# Patient Record
Sex: FEMALE | Race: ASIAN | NOT HISPANIC OR LATINO | URBAN - METROPOLITAN AREA
[De-identification: names, ages, dates, MRNs, and addresses within clinical notes are randomized per-mention and may not be internally consistent; named-entity substitution may affect disease eponyms.]

---

## 2019-09-12 ENCOUNTER — EMERGENCY (EMERGENCY)
Facility: HOSPITAL | Age: 39
LOS: 1 days | Discharge: HOME | End: 2019-09-12
Attending: EMERGENCY MEDICINE
Payer: COMMERCIAL

## 2019-09-12 VITALS
HEIGHT: 64 IN | RESPIRATION RATE: 18 BRPM | DIASTOLIC BLOOD PRESSURE: 90 MMHG | SYSTOLIC BLOOD PRESSURE: 133 MMHG | TEMPERATURE: 103 F | OXYGEN SATURATION: 98 % | WEIGHT: 149.91 LBS | HEART RATE: 108 BPM

## 2019-09-12 VITALS
SYSTOLIC BLOOD PRESSURE: 99 MMHG | HEART RATE: 87 BPM | DIASTOLIC BLOOD PRESSURE: 58 MMHG | TEMPERATURE: 99 F | OXYGEN SATURATION: 100 % | RESPIRATION RATE: 16 BRPM

## 2019-09-12 LAB
ALBUMIN SERPL ELPH-MCNC: 4.4 G/DL — SIGNIFICANT CHANGE UP (ref 3.3–5)
ALP SERPL-CCNC: 162 U/L — HIGH (ref 40–120)
ALT FLD-CCNC: 329 U/L — HIGH (ref 10–45)
ANION GAP SERPL CALC-SCNC: 14 MMOL/L — SIGNIFICANT CHANGE UP (ref 5–17)
APPEARANCE UR: ABNORMAL
AST SERPL-CCNC: 363 U/L — HIGH (ref 10–40)
BASOPHILS # BLD AUTO: 0.1 K/UL — SIGNIFICANT CHANGE UP (ref 0–0.2)
BASOPHILS NFR BLD AUTO: 0.7 % — SIGNIFICANT CHANGE UP (ref 0–2)
BILIRUB SERPL-MCNC: 0.7 MG/DL — SIGNIFICANT CHANGE UP (ref 0.2–1.2)
BILIRUB UR-MCNC: NEGATIVE — SIGNIFICANT CHANGE UP
BUN SERPL-MCNC: 7 MG/DL — SIGNIFICANT CHANGE UP (ref 7–23)
CALCIUM SERPL-MCNC: 9.5 MG/DL — SIGNIFICANT CHANGE UP (ref 8.4–10.5)
CHLORIDE SERPL-SCNC: 96 MMOL/L — SIGNIFICANT CHANGE UP (ref 96–108)
CO2 SERPL-SCNC: 22 MMOL/L — SIGNIFICANT CHANGE UP (ref 22–31)
COLOR SPEC: YELLOW — SIGNIFICANT CHANGE UP
CREAT SERPL-MCNC: 0.68 MG/DL — SIGNIFICANT CHANGE UP (ref 0.5–1.3)
DIFF PNL FLD: ABNORMAL
EOSINOPHIL # BLD AUTO: 0.1 K/UL — SIGNIFICANT CHANGE UP (ref 0–0.5)
EOSINOPHIL NFR BLD AUTO: 0.7 % — SIGNIFICANT CHANGE UP (ref 0–6)
GLUCOSE SERPL-MCNC: 124 MG/DL — HIGH (ref 70–99)
GLUCOSE UR QL: NEGATIVE — SIGNIFICANT CHANGE UP
HCG UR QL: NEGATIVE — SIGNIFICANT CHANGE UP
HCT VFR BLD CALC: 45.5 % — HIGH (ref 34.5–45)
HGB BLD-MCNC: 14.5 G/DL — SIGNIFICANT CHANGE UP (ref 11.5–15.5)
KETONES UR-MCNC: ABNORMAL
LEUKOCYTE ESTERASE UR-ACNC: ABNORMAL
LYMPHOCYTES # BLD AUTO: 1.2 K/UL — SIGNIFICANT CHANGE UP (ref 1–3.3)
LYMPHOCYTES # BLD AUTO: 15.1 % — SIGNIFICANT CHANGE UP (ref 13–44)
MCHC RBC-ENTMCNC: 29.5 PG — SIGNIFICANT CHANGE UP (ref 27–34)
MCHC RBC-ENTMCNC: 31.9 GM/DL — LOW (ref 32–36)
MCV RBC AUTO: 92.5 FL — SIGNIFICANT CHANGE UP (ref 80–100)
MONOCYTES # BLD AUTO: 0.4 K/UL — SIGNIFICANT CHANGE UP (ref 0–0.9)
MONOCYTES NFR BLD AUTO: 5.1 % — SIGNIFICANT CHANGE UP (ref 2–14)
NEUTROPHILS # BLD AUTO: 6.3 K/UL — SIGNIFICANT CHANGE UP (ref 1.8–7.4)
NEUTROPHILS NFR BLD AUTO: 78.4 % — HIGH (ref 43–77)
NITRITE UR-MCNC: NEGATIVE — SIGNIFICANT CHANGE UP
PH UR: 6.5 — SIGNIFICANT CHANGE UP (ref 5–8)
PLATELET # BLD AUTO: 218 K/UL — SIGNIFICANT CHANGE UP (ref 150–400)
POTASSIUM SERPL-MCNC: 3.3 MMOL/L — LOW (ref 3.5–5.3)
POTASSIUM SERPL-SCNC: 3.3 MMOL/L — LOW (ref 3.5–5.3)
PROT SERPL-MCNC: 7.7 G/DL — SIGNIFICANT CHANGE UP (ref 6–8.3)
PROT UR-MCNC: ABNORMAL
RBC # BLD: 4.92 M/UL — SIGNIFICANT CHANGE UP (ref 3.8–5.2)
RBC # FLD: 11.8 % — SIGNIFICANT CHANGE UP (ref 10.3–14.5)
SODIUM SERPL-SCNC: 132 MMOL/L — LOW (ref 135–145)
SP GR SPEC: 1.02 — SIGNIFICANT CHANGE UP (ref 1.01–1.02)
UROBILINOGEN FLD QL: ABNORMAL
WBC # BLD: 8 K/UL — SIGNIFICANT CHANGE UP (ref 3.8–10.5)
WBC # FLD AUTO: 8 K/UL — SIGNIFICANT CHANGE UP (ref 3.8–10.5)

## 2019-09-12 PROCEDURE — 99284 EMERGENCY DEPT VISIT MOD MDM: CPT

## 2019-09-12 PROCEDURE — 71045 X-RAY EXAM CHEST 1 VIEW: CPT | Mod: 26

## 2019-09-12 PROCEDURE — 71045 X-RAY EXAM CHEST 1 VIEW: CPT

## 2019-09-12 PROCEDURE — 76705 ECHO EXAM OF ABDOMEN: CPT | Mod: 26

## 2019-09-12 PROCEDURE — 81001 URINALYSIS AUTO W/SCOPE: CPT

## 2019-09-12 PROCEDURE — 80053 COMPREHEN METABOLIC PANEL: CPT

## 2019-09-12 PROCEDURE — 96361 HYDRATE IV INFUSION ADD-ON: CPT

## 2019-09-12 PROCEDURE — 81025 URINE PREGNANCY TEST: CPT

## 2019-09-12 PROCEDURE — 96374 THER/PROPH/DIAG INJ IV PUSH: CPT

## 2019-09-12 PROCEDURE — 85027 COMPLETE CBC AUTOMATED: CPT

## 2019-09-12 PROCEDURE — 87086 URINE CULTURE/COLONY COUNT: CPT

## 2019-09-12 PROCEDURE — 99284 EMERGENCY DEPT VISIT MOD MDM: CPT | Mod: 25

## 2019-09-12 PROCEDURE — 76705 ECHO EXAM OF ABDOMEN: CPT

## 2019-09-12 RX ORDER — KETOROLAC TROMETHAMINE 30 MG/ML
15 SYRINGE (ML) INJECTION ONCE
Refills: 0 | Status: DISCONTINUED | OUTPATIENT
Start: 2019-09-12 | End: 2019-09-12

## 2019-09-12 RX ORDER — CEPHALEXIN 500 MG
1 CAPSULE ORAL
Qty: 15 | Refills: 0
Start: 2019-09-12 | End: 2019-09-16

## 2019-09-12 RX ORDER — ACETAMINOPHEN 500 MG
650 TABLET ORAL ONCE
Refills: 0 | Status: COMPLETED | OUTPATIENT
Start: 2019-09-12 | End: 2019-09-12

## 2019-09-12 RX ORDER — SODIUM CHLORIDE 9 MG/ML
2000 INJECTION INTRAMUSCULAR; INTRAVENOUS; SUBCUTANEOUS ONCE
Refills: 0 | Status: COMPLETED | OUTPATIENT
Start: 2019-09-12 | End: 2019-09-12

## 2019-09-12 RX ORDER — SODIUM CHLORIDE 9 MG/ML
500 INJECTION INTRAMUSCULAR; INTRAVENOUS; SUBCUTANEOUS ONCE
Refills: 0 | Status: COMPLETED | OUTPATIENT
Start: 2019-09-12 | End: 2019-09-12

## 2019-09-12 RX ADMIN — Medication 15 MILLIGRAM(S): at 02:07

## 2019-09-12 RX ADMIN — SODIUM CHLORIDE 2000 MILLILITER(S): 9 INJECTION INTRAMUSCULAR; INTRAVENOUS; SUBCUTANEOUS at 04:34

## 2019-09-12 RX ADMIN — Medication 650 MILLIGRAM(S): at 01:11

## 2019-09-12 RX ADMIN — SODIUM CHLORIDE 2000 MILLILITER(S): 9 INJECTION INTRAMUSCULAR; INTRAVENOUS; SUBCUTANEOUS at 01:05

## 2019-09-12 RX ADMIN — Medication 650 MILLIGRAM(S): at 02:07

## 2019-09-12 RX ADMIN — Medication 15 MILLIGRAM(S): at 01:12

## 2019-09-12 RX ADMIN — SODIUM CHLORIDE 500 MILLILITER(S): 9 INJECTION INTRAMUSCULAR; INTRAVENOUS; SUBCUTANEOUS at 04:18

## 2019-09-12 NOTE — ED PROVIDER NOTE - PHYSICAL EXAMINATION
Gen: AAOx3, non-toxic  Head: NCAT  HEENT: EOMI, oral mucosa moist, normal conjunctiva  Lung: CTAB, no respiratory distress, no wheezes/rhonchi/rales B/L, speaking in full sentences  CV: RRR, no murmurs, rubs or gallops  Abd: soft, NTND, no guarding, no CVA tenderness  MSK: no visible deformities  Neuro: No focal sensory or motor deficits  Skin: Warm, well perfused, no rash  Psych: normal affect.   ~Crow Kristy PGY2 Gen: AAOx3, non-toxic  Head: NCAT  HEENT: EOMI, oral mucosa moist, normal conjunctiva  Lung: CTAB, no respiratory distress, no wheezes/rhonchi/rales B/L, speaking in full sentences  CV: RRR, no murmurs, rubs or gallops  Abd: soft, NTND, no guarding  MSK: no visible deformities  Neuro: No focal sensory or motor deficits  Skin: Warm, well perfused, no rash  Psych: normal affect.   ~Crow Kristy PGY2

## 2019-09-12 NOTE — ED PROVIDER NOTE - PATIENT PORTAL LINK FT
You can access the FollowMyHealth Patient Portal offered by [facility] by registering at the following website: [email or web address]. By joining [employer]’s FollowMyHealth portal, you will also be able to view your health information using other applications (apps) compatible with our system.

## 2019-09-12 NOTE — ED PROVIDER NOTE - NS ED ROS FT
Gen: +fevers  Eyes: No vision changes, eye irritation or discharge  ENT: +sore throat  Resp: +cough. No SOB  Cardiovascular: No chest pain or palpitations  GI: No abdominal pain, nausea/vomiting, diarrhea, constipation  :  No change in urine output or frequency; no dysuria  MS: + low back pain  Skin: No rashes  Neuro: + headache; No numbness or weakness  Remainder negative, except as per the HPI

## 2019-09-12 NOTE — ED ADULT NURSE NOTE - OBJECTIVE STATEMENT
39 yo f reporting to ED for fever. No PMH. pt reporting fever & productive cough x3 days. Pt was evaluated by PMD whom prescribed Amoxicillin. Pt has been taking tylenol (last dose of 1000mg at 18:00) with minimal relief. Max temperature of 104.2 F at home. Pt AAOx3, NAD, lungs clear bilat, abdomen soft nontender nondistended, strong peripheral pulses x 4, cap refill < 2 seconds, skin warm and dry. Pt denies headache, dizziness, chest pain, palpitations, SOB, abdominal pain, n/v/d, urinary symptoms, weakness at this time. 20 gauge established in the RAC. Pt placed on CM. NSR. EKG completed.  at bedside. bed locked & in lowest position. Safety maintained. Will continue to reassess. 37 yo f reporting to ED for fever. No PMH. pt reporting fever & productive cough x3 days. Pt was evaluated by PMD whom prescribed Amoxicillin. Pt has been taking tylenol (last dose of 1000mg at 18:00) with minimal relief. Max temperature of 104.2 F at home. Pt AAOx3, NAD, lungs clear bilat, abdomen soft nontender nondistended, strong peripheral pulses x 4, cap refill < 2 seconds, skin warm and dry. Pt denies headache, dizziness, chest pain, palpitations, SOB, abdominal pain, vomiting, diarrhea, blood in the urine, blood in the stool, urinary symptoms, weakness at this time. 20 gauge established in the RAC. Pt placed on CM. NSR. EKG completed.  at bedside. bed locked & in lowest position. Safety maintained. Will continue to reassess.

## 2019-09-12 NOTE — ED ADULT NURSE NOTE - NURSING NEURO ORIENTATION
Pt returned call. Spoke to pt, reviewed results and POC per AMS. Pt verbalized understanding. Pt does not want to do PT at this time as her insurance does not cover it.   Suggested stretching exercises and that pt should rest and monitor and let our offi oriented to person, place and time

## 2019-09-12 NOTE — ED PROVIDER NOTE - OBJECTIVE STATEMENT
38y female with no significant PMH presenting with fevers x4 days. Has had persistent fevers for 4 days, respond to tylenol, but return, Tmax was 104 measured by ear today. Has had a headache, sore throat, cough and lower back pain. No photophobia, no neck stiffness. Has had two sick contacts at home. No recent travel, no rashes, states that her vaccines are up to date. No n/v/d, urinary symptoms.

## 2019-09-12 NOTE — ED ADULT NURSE NOTE - CHPI ED NUR SYMPTOMS NEG
no nausea/no pain/no vomiting/no chills/no dizziness/no weakness/no tingling/no decreased eating/drinking

## 2019-09-12 NOTE — ED ADULT NURSE REASSESSMENT NOTE - NS ED NURSE REASSESS COMMENT FT1
Pt AAOx4, NAD, resting comfortably in bed with spouse at bedside. Pt denies headache, dizziness, chest pain, cough, SOB, abdominal pain, n/v/d, urinary symptoms, fevers, chills, weakness at this time. MD Small aware of low blood pressure; pt okay to be discharged. Pt verbalized understanding to follow-up with PMD. Pt verbalized understanding to take antibiotics as prescribed. Pt verbalized understanding to follow-up with PMD regarding LFTs. Pt verbalized understanding to return to ED for revers, n/v, blood in the urine, fevers, weakness, chest pain, & SOB/ Pt discharged as per MD, IV removed as per MD, pt ambulated independently out of ED.

## 2019-09-12 NOTE — ED ADULT NURSE REASSESSMENT NOTE - NS ED NURSE REASSESS COMMENT FT1
03:30. Pt ambulating to restroom at this time. Pt denies chest pain, SOB, palpitations, dizziness, lightheadedness, & weakness. Gait steady.

## 2019-09-12 NOTE — ED PROVIDER NOTE - ATTENDING CONTRIBUTION TO CARE
37 y/o f presenting w/ cc of fever since monday, dry cough, no producive sputum, no sob, no rash, no back pain, no urinary symptoms, no ha, no neck pain, pos sore throat, saw pcp today and got amoxicillin for uknown reasons. has fever, tachy, but normal hr at bs. no clear signs of bacterial infection, pos erythema of throat, ears normal, chest clear, abd soft nt, legs no ttp. not sepsis given no bacterial source. plan for antipyretics, fluids. cxr. 37 y/o f presenting w/ cc of fever since monday, dry cough, no producive sputum, no sob, no rash, no back pain, no urinary symptoms, no ha, no neck pain, pos sore throat, saw pcp today and got amoxicillin for uknown reasons. has fever, tachy, but normal hr at bs. no clear signs of bacterial infection, pos erythema of throat, ears normal, chest clear, abd soft nt, legs no ttp. not sepsis given no bacterial source. plan for antipyretics, fluids. cxr.    No obvious pna on x ray, possible UTI, pt improved significantly with antypyretics and fluids, bp was soft but had maps of 69. ambulated w/ no sob, lightheadedness, dizzyness. given 2500 of fluid, pt wanted to go home after workup. abx changed to keflex for possible uti.

## 2019-09-12 NOTE — ED ADULT NURSE NOTE - NSIMPLEMENTINTERV_GEN_ALL_ED
Implemented All Universal Safety Interventions:  [place] to call system. Call bell, personal items and telephone within reach. Instruct patient to call for assistance. Room bathroom lighting operational. Non-slip footwear when patient is off stretcher. Physically safe environment: no spills, clutter or unnecessary equipment. Stretcher in lowest position, wheels locked, appropriate side rails in place.

## 2019-09-12 NOTE — ED PROVIDER NOTE - CLINICAL SUMMARY MEDICAL DECISION MAKING FREE TEXT BOX
38y female presenting with fever for 4 days. Has had sore throat, headache, low back pain, with sick contacts. Low suspicion for meningitis no neck stiffness, no pain with ROM or inhibited ROM of the neck, no photophobia. More likely viral illness. Will get labs, CXR.

## 2019-09-12 NOTE — ED PROVIDER NOTE - NSFOLLOWUPINSTRUCTIONS_ED_ALL_ED_FT
Please follow up with primary physician within the next week. You need to follow up your liver function tests and need repeat blood work at your next doctors visit.     Do not participate in any contact sports or any strenuous physical activity until cleared by your doctor.    Stop taking the Amoxicillin and start taking the cephalexin that was sent to your pharmacy.    Return to the emergency department if you have new or worsening symptoms, such as: worsening headaches, worsening fevers, worsening body aches, or vomiting.

## 2019-09-13 LAB
CULTURE RESULTS: NO GROWTH — SIGNIFICANT CHANGE UP
SPECIMEN SOURCE: SIGNIFICANT CHANGE UP

## 2019-09-15 ENCOUNTER — INPATIENT (INPATIENT)
Facility: HOSPITAL | Age: 39
LOS: 5 days | Discharge: HOME | DRG: 178 | End: 2019-09-21
Attending: HOSPITALIST | Admitting: HOSPITALIST
Payer: COMMERCIAL

## 2019-09-15 ENCOUNTER — TRANSCRIPTION ENCOUNTER (OUTPATIENT)
Age: 39
End: 2019-09-15

## 2019-09-15 VITALS
HEART RATE: 91 BPM | WEIGHT: 149.91 LBS | OXYGEN SATURATION: 96 % | DIASTOLIC BLOOD PRESSURE: 67 MMHG | RESPIRATION RATE: 20 BRPM | SYSTOLIC BLOOD PRESSURE: 95 MMHG | HEIGHT: 64 IN | TEMPERATURE: 98 F

## 2019-09-15 DIAGNOSIS — J18.1 LOBAR PNEUMONIA, UNSPECIFIED ORGANISM: ICD-10-CM

## 2019-09-15 LAB
ALBUMIN SERPL ELPH-MCNC: 3.8 G/DL — SIGNIFICANT CHANGE UP (ref 3.3–5)
ALP SERPL-CCNC: 475 U/L — HIGH (ref 40–120)
ALT FLD-CCNC: 604 U/L — HIGH (ref 10–45)
ANION GAP SERPL CALC-SCNC: 16 MMOL/L — SIGNIFICANT CHANGE UP (ref 5–17)
APPEARANCE UR: CLEAR — SIGNIFICANT CHANGE UP
APTT BLD: 32.1 SEC — SIGNIFICANT CHANGE UP (ref 27.5–36.3)
AST SERPL-CCNC: 317 U/L — HIGH (ref 10–40)
BACTERIA # UR AUTO: NEGATIVE — SIGNIFICANT CHANGE UP
BASOPHILS # BLD AUTO: 0 K/UL — SIGNIFICANT CHANGE UP (ref 0–0.2)
BASOPHILS NFR BLD AUTO: 0.4 % — SIGNIFICANT CHANGE UP (ref 0–2)
BILIRUB SERPL-MCNC: 0.5 MG/DL — SIGNIFICANT CHANGE UP (ref 0.2–1.2)
BILIRUB UR-MCNC: NEGATIVE — SIGNIFICANT CHANGE UP
BUN SERPL-MCNC: 6 MG/DL — LOW (ref 7–23)
CALCIUM SERPL-MCNC: 9.2 MG/DL — SIGNIFICANT CHANGE UP (ref 8.4–10.5)
CHLORIDE SERPL-SCNC: 99 MMOL/L — SIGNIFICANT CHANGE UP (ref 96–108)
CO2 SERPL-SCNC: 23 MMOL/L — SIGNIFICANT CHANGE UP (ref 22–31)
COLOR SPEC: COLORLESS — SIGNIFICANT CHANGE UP
CREAT SERPL-MCNC: 0.6 MG/DL — SIGNIFICANT CHANGE UP (ref 0.5–1.3)
DIFF PNL FLD: ABNORMAL
EOSINOPHIL # BLD AUTO: 0.1 K/UL — SIGNIFICANT CHANGE UP (ref 0–0.5)
EOSINOPHIL NFR BLD AUTO: 1.9 % — SIGNIFICANT CHANGE UP (ref 0–6)
EPI CELLS # UR: 1 /HPF — SIGNIFICANT CHANGE UP
GLUCOSE SERPL-MCNC: 102 MG/DL — HIGH (ref 70–99)
GLUCOSE UR QL: NEGATIVE — SIGNIFICANT CHANGE UP
HCG UR QL: NEGATIVE — SIGNIFICANT CHANGE UP
HCT VFR BLD CALC: 39.3 % — SIGNIFICANT CHANGE UP (ref 34.5–45)
HGB BLD-MCNC: 13.1 G/DL — SIGNIFICANT CHANGE UP (ref 11.5–15.5)
HYALINE CASTS # UR AUTO: 1 /LPF — SIGNIFICANT CHANGE UP (ref 0–2)
INR BLD: 1.07 RATIO — SIGNIFICANT CHANGE UP (ref 0.88–1.16)
KETONES UR-MCNC: NEGATIVE — SIGNIFICANT CHANGE UP
LACTATE BLDV-MCNC: 1.3 MMOL/L — SIGNIFICANT CHANGE UP (ref 0.7–2)
LEUKOCYTE ESTERASE UR-ACNC: ABNORMAL
LYMPHOCYTES # BLD AUTO: 1.3 K/UL — SIGNIFICANT CHANGE UP (ref 1–3.3)
LYMPHOCYTES # BLD AUTO: 22 % — SIGNIFICANT CHANGE UP (ref 13–44)
MCHC RBC-ENTMCNC: 30.6 PG — SIGNIFICANT CHANGE UP (ref 27–34)
MCHC RBC-ENTMCNC: 33.3 GM/DL — SIGNIFICANT CHANGE UP (ref 32–36)
MCV RBC AUTO: 92 FL — SIGNIFICANT CHANGE UP (ref 80–100)
MONOCYTES # BLD AUTO: 0.5 K/UL — SIGNIFICANT CHANGE UP (ref 0–0.9)
MONOCYTES NFR BLD AUTO: 7.7 % — SIGNIFICANT CHANGE UP (ref 2–14)
NEUTROPHILS # BLD AUTO: 4 K/UL — SIGNIFICANT CHANGE UP (ref 1.8–7.4)
NEUTROPHILS NFR BLD AUTO: 68 % — SIGNIFICANT CHANGE UP (ref 43–77)
NITRITE UR-MCNC: NEGATIVE — SIGNIFICANT CHANGE UP
PH UR: 6.5 — SIGNIFICANT CHANGE UP (ref 5–8)
PLATELET # BLD AUTO: 255 K/UL — SIGNIFICANT CHANGE UP (ref 150–400)
POTASSIUM SERPL-MCNC: 3.3 MMOL/L — LOW (ref 3.5–5.3)
POTASSIUM SERPL-SCNC: 3.3 MMOL/L — LOW (ref 3.5–5.3)
PROT SERPL-MCNC: 7.3 G/DL — SIGNIFICANT CHANGE UP (ref 6–8.3)
PROT UR-MCNC: NEGATIVE — SIGNIFICANT CHANGE UP
PROTHROM AB SERPL-ACNC: 12.2 SEC — SIGNIFICANT CHANGE UP (ref 10–12.9)
RAPID RVP RESULT: SIGNIFICANT CHANGE UP
RBC # BLD: 4.28 M/UL — SIGNIFICANT CHANGE UP (ref 3.8–5.2)
RBC # FLD: 12 % — SIGNIFICANT CHANGE UP (ref 10.3–14.5)
RBC CASTS # UR COMP ASSIST: 7 /HPF — HIGH (ref 0–4)
SODIUM SERPL-SCNC: 138 MMOL/L — SIGNIFICANT CHANGE UP (ref 135–145)
SP GR SPEC: 1 — LOW (ref 1.01–1.02)
UROBILINOGEN FLD QL: NEGATIVE — SIGNIFICANT CHANGE UP
WBC # BLD: 5.9 K/UL — SIGNIFICANT CHANGE UP (ref 3.8–10.5)
WBC # FLD AUTO: 5.9 K/UL — SIGNIFICANT CHANGE UP (ref 3.8–10.5)
WBC UR QL: 3 /HPF — SIGNIFICANT CHANGE UP (ref 0–5)

## 2019-09-15 PROCEDURE — 93010 ELECTROCARDIOGRAM REPORT: CPT | Mod: 59

## 2019-09-15 PROCEDURE — 74177 CT ABD & PELVIS W/CONTRAST: CPT | Mod: 26

## 2019-09-15 PROCEDURE — 99285 EMERGENCY DEPT VISIT HI MDM: CPT

## 2019-09-15 PROCEDURE — 71046 X-RAY EXAM CHEST 2 VIEWS: CPT | Mod: 26

## 2019-09-15 PROCEDURE — 71260 CT THORAX DX C+: CPT | Mod: 26

## 2019-09-15 RX ORDER — VANCOMYCIN HCL 1 G
1000 VIAL (EA) INTRAVENOUS ONCE
Refills: 0 | Status: COMPLETED | OUTPATIENT
Start: 2019-09-15 | End: 2019-09-15

## 2019-09-15 RX ORDER — SODIUM CHLORIDE 9 MG/ML
1000 INJECTION INTRAMUSCULAR; INTRAVENOUS; SUBCUTANEOUS ONCE
Refills: 0 | Status: COMPLETED | OUTPATIENT
Start: 2019-09-15 | End: 2019-09-15

## 2019-09-15 RX ORDER — ACETAMINOPHEN 500 MG
975 TABLET ORAL ONCE
Refills: 0 | Status: COMPLETED | OUTPATIENT
Start: 2019-09-15 | End: 2019-09-15

## 2019-09-15 RX ORDER — POTASSIUM CHLORIDE 20 MEQ
40 PACKET (EA) ORAL ONCE
Refills: 0 | Status: COMPLETED | OUTPATIENT
Start: 2019-09-15 | End: 2019-09-15

## 2019-09-15 RX ORDER — CEFTRIAXONE 500 MG/1
1000 INJECTION, POWDER, FOR SOLUTION INTRAMUSCULAR; INTRAVENOUS ONCE
Refills: 0 | Status: COMPLETED | OUTPATIENT
Start: 2019-09-15 | End: 2019-09-15

## 2019-09-15 RX ORDER — AZITHROMYCIN 500 MG/1
500 TABLET, FILM COATED ORAL EVERY 24 HOURS
Refills: 0 | Status: DISCONTINUED | OUTPATIENT
Start: 2019-09-15 | End: 2019-09-17

## 2019-09-15 RX ADMIN — Medication 975 MILLIGRAM(S): at 21:51

## 2019-09-15 RX ADMIN — SODIUM CHLORIDE 2000 MILLILITER(S): 9 INJECTION INTRAMUSCULAR; INTRAVENOUS; SUBCUTANEOUS at 17:58

## 2019-09-15 RX ADMIN — AZITHROMYCIN 250 MILLIGRAM(S): 500 TABLET, FILM COATED ORAL at 22:00

## 2019-09-15 RX ADMIN — CEFTRIAXONE 100 MILLIGRAM(S): 500 INJECTION, POWDER, FOR SOLUTION INTRAMUSCULAR; INTRAVENOUS at 23:20

## 2019-09-15 RX ADMIN — Medication 250 MILLIGRAM(S): at 23:41

## 2019-09-15 RX ADMIN — Medication 40 MILLIEQUIVALENT(S): at 19:28

## 2019-09-15 NOTE — ED PROVIDER NOTE - CONSTITUTIONAL, MLM
normal... Ill appearing, however alert, oriented to person, place, time/situation and in no acute distress.

## 2019-09-15 NOTE — ED PROVIDER NOTE - PROGRESS NOTE DETAILS
Janine Soliz PGY2  +RUL PNA, started on iv abx, admitted to hospitalist. radiologist prelim states PNA with no cavitations

## 2019-09-15 NOTE — ED PROVIDER NOTE - OBJECTIVE STATEMENT
38F no PMH presenting with 1 week of high fevers, and progressing productive cough. Initially light yellow sputum, now dark brown sputum with small streaks of blood. Mild sore throat and dyspnea associated with cough. No chest pain. No abdominal pain, nausea, vomiting, or diarrhea. No dysuria or other urinary symptoms. Patient visited ED 4 days ago, abx were changed from amoxicillin (she took 1 dose) to cephalexin (today is day 4). Tested positive for UTI at that time. This morning, she visited urgent care because her fevers were persistent despite abx (documented to be 102.9F this AM), but her UA looked improved from prior. Patient has taken Theraflu for fevers in past week. Her mother in law is sick with cough. 38F no PMH presenting with 1 week of high fevers, and progressing productive cough. Initially light yellow sputum, now rust colored with small streaks of blood. No raj hemoptysis. Mild sore throat and dyspnea associated with cough. No chest pain. No abdominal pain, nausea, vomiting, or diarrhea. No dysuria or other urinary symptoms. Patient visited ED 4 days ago, abx were changed from amoxicillin (she took 1 dose) to cephalexin (today is day 4). Tested positive for UTI at that time. This morning, she visited urgent care because her fevers were persistent despite abx (documented to be 102.9F this AM), but her UA looked improved from prior. Patient has taken Theraflu for fevers in past week. Her mother in law is sick with cough.

## 2019-09-15 NOTE — ED PROVIDER NOTE - CLINICAL SUMMARY MEDICAL DECISION MAKING FREE TEXT BOX
38F no notable PMH presenting with 1 week of persistent fevers despite abx (cephalexin), recently seen in ED, diagnosed with UTI. Pt with productive cough. Check CXR, RVP, CBC, CMP (given transaminitis during earlier ED visit), VBG with lactate. Give NS fluid bolus given soft bps and febrile. 38F no notable PMH presenting with 1 week of persistent fevers despite abx (cephalexin), recently seen in ED, diagnosed with UTI. Pt with productive cough. Check CXR, RVP, CBC, CMP (given transaminitis during earlier ED visit), blood and urine cultures, UA, VBG with lactate. Give NS fluid bolus given soft bps and febrile.

## 2019-09-15 NOTE — ED PROVIDER NOTE - ATTENDING CONTRIBUTION TO CARE
38F w/ no reported PMHx presenting with 1 week of high fevers, and progressing productive cough. Initially light yellow sputum, now rust colored with small streaks of blood. No raj hemoptysis. Mild sore throat and dyspnea associated with cough. . No dysuria or other urinary symptoms at this time. Patient visited ED 4 days ago, antibiotics were changed from amoxicillin (she took 1 dose) to cephalexin (today is day 4) for possible UTI at that time (UCx negative now). This morning, she visited urgent care because her fevers were persistent despite antibiotics (documented to be 102.9F this AM), but her UA looked improved from prior. Patient has taken Theraflu for fevers in past week. Her mother in law is sick with cough. Denies any recent hospitalizations, travel, denies working in hospitals or prisons.    ROS:  GEN: (+) fevers/chills, (-) weight loss, (-) fatigue/malaise  HEENT: (-) visual change  NECK: (-) stiffness, (-) swelling  RESP: (+) shortness of breath, (+) cough, (+) sputum, (-) hemoptysis, (-) DICKENS  CV: (-) chest pain, (-) palpitations  GI: (-) nausea, (-) vomiting, (-) pain, (-) constipation, (-) diarrhea  : (-) frequency/urgency, (-) hematuria, (-) dysuria, (-) incontinence, (-) discharge  EXT: (-) edema  ENDO: (-) heat/cold intolerance, (-) polyuria  NEURO: (-) paresthesias, (-) weakness, (-) headache, (-) dizziness, (-) syncope  MSK: no muscle pain   SKIN: (-) rash    PMHx: Denies  PSHX: Denies  Allergies: NKDA  SocHx: Denies ETOH/drugs/smoking.    PHYSICAL EXAMINATION:  VITALS REVIEWED.  Afebrile, otherwise normal  GENERALIZED APPEARANCE:  Tired appearing, no acute distress, ambulating without difficulty  SKIN:  Warm, dry, no cyanosis  HEAD:  No obvious scalp lesions  EYES:  Conjunctiva pink, no icterus  ENMT:  Mucus membranes moist, no stridor  NECK:  Supple, non-tender  CHEST AND RESPIRATORY:  Clear to auscultation B/L, good air entry B/L, equal chest expansion  HEART AND CARDIOVASCULAR:  Regular rate, no obvious murmur  ABDOMEN AND GI:  Soft, non-tender, non-distended.  No rebound, no guarding  EXTREMITIES:  No deformity, edema, or calf tenderness  NEURO: AAOx3, gross motor and sensory intact  PSYCH: Normal affect     Impression:  R/o PNA (?Kle[place]se[place] v. Legionella?), r/o other infectious etiology (viral/EBV) given transaminitis prior, r/o UTI, given patient is a return visit to the ED, high risk, r/o electrolyte abnormalities; labs, imaging, antibiotics, IVF, admit

## 2019-09-15 NOTE — ED ADULT NURSE NOTE - CHPI ED NUR SYMPTOMS NEG
no rash/no vomiting/no shortness of breath/no decreased eating/drinking/no diarrhea/no chills/no abdominal pain/no headache

## 2019-09-15 NOTE — ED PROVIDER NOTE - CARE PLAN
Principal Discharge DX:	Viral URI with cough Principal Discharge DX:	Pneumonia of upper lobe of lung

## 2019-09-15 NOTE — ED ADULT NURSE NOTE - OBJECTIVE STATEMENT
38F comes to ED c/o return of fevers. States she was seen on W[place] and told she had UTI. She was prescribed amoxicillin by PMD, and was changed to keflex by  ED team. She states she had 104 fever at home last night. Came to ED from urgent care (received ibuprofen at urgent care). States she has bilateral lower back pain and left knee pain. Has productive cough (brown sputum) with throat pain while coughing. Denies N/V/D/dizziness/abdominal pain/urinary symptoms. States she is able to tolerate PO. On exam, no CVA tenderness, lungs are clear, no edema, MARV, membranes moist. Currently on menstrual cycle. Will continue to monitor.

## 2019-09-15 NOTE — ED ADULT NURSE REASSESSMENT NOTE - NS ED NURSE REASSESS COMMENT FT1
Report received from JEFFERY Kumar. Pt AOx3 breathing even unlabored spontaneously NAD VSS, awaits CT chest.

## 2019-09-16 ENCOUNTER — TRANSCRIPTION ENCOUNTER (OUTPATIENT)
Age: 39
End: 2019-09-16

## 2019-09-16 DIAGNOSIS — N39.0 URINARY TRACT INFECTION, SITE NOT SPECIFIED: ICD-10-CM

## 2019-09-16 DIAGNOSIS — R74.0 NONSPECIFIC ELEVATION OF LEVELS OF TRANSAMINASE AND LACTIC ACID DEHYDROGENASE [LDH]: ICD-10-CM

## 2019-09-16 DIAGNOSIS — Z29.9 ENCOUNTER FOR PROPHYLACTIC MEASURES, UNSPECIFIED: ICD-10-CM

## 2019-09-16 DIAGNOSIS — J18.1 LOBAR PNEUMONIA, UNSPECIFIED ORGANISM: ICD-10-CM

## 2019-09-16 LAB
A1AT SERPL-MCNC: 245 MG/DL — HIGH (ref 90–200)
ALBUMIN SERPL ELPH-MCNC: 3.7 G/DL — SIGNIFICANT CHANGE UP (ref 3.3–5)
ALP SERPL-CCNC: 445 U/L — HIGH (ref 40–120)
ALT FLD-CCNC: 556 U/L — HIGH (ref 10–45)
ANION GAP SERPL CALC-SCNC: 14 MMOL/L — SIGNIFICANT CHANGE UP (ref 5–17)
APAP SERPL-MCNC: <15 UG/ML — SIGNIFICANT CHANGE UP (ref 10–30)
APTT BLD: 31.1 SEC — SIGNIFICANT CHANGE UP (ref 27.5–36.3)
AST SERPL-CCNC: 336 U/L — HIGH (ref 10–40)
BASOPHILS # BLD AUTO: 0.01 K/UL — SIGNIFICANT CHANGE UP (ref 0–0.2)
BASOPHILS NFR BLD AUTO: 0.2 % — SIGNIFICANT CHANGE UP (ref 0–2)
BILIRUB SERPL-MCNC: 0.5 MG/DL — SIGNIFICANT CHANGE UP (ref 0.2–1.2)
BUN SERPL-MCNC: 4 MG/DL — LOW (ref 7–23)
CALCIUM SERPL-MCNC: 8.9 MG/DL — SIGNIFICANT CHANGE UP (ref 8.4–10.5)
CERULOPLASMIN SERPL-MCNC: 41 MG/DL — SIGNIFICANT CHANGE UP (ref 16–45)
CHLORIDE SERPL-SCNC: 100 MMOL/L — SIGNIFICANT CHANGE UP (ref 96–108)
CO2 SERPL-SCNC: 23 MMOL/L — SIGNIFICANT CHANGE UP (ref 22–31)
CREAT SERPL-MCNC: 0.49 MG/DL — LOW (ref 0.5–1.3)
CULTURE RESULTS: NO GROWTH — SIGNIFICANT CHANGE UP
EBV EA AB SER IA-ACNC: <5 U/ML — SIGNIFICANT CHANGE UP
EBV EA AB TITR SER IF: POSITIVE
EBV EA IGG SER-ACNC: NEGATIVE — SIGNIFICANT CHANGE UP
EBV NA IGG SER IA-ACNC: 305 U/ML — HIGH
EBV PATRN SPEC IB-IMP: SIGNIFICANT CHANGE UP
EBV VCA IGG AVIDITY SER QL IA: POSITIVE
EBV VCA IGM SER IA-ACNC: 93 U/ML — HIGH
EBV VCA IGM SER IA-ACNC: <10 U/ML — SIGNIFICANT CHANGE UP
EBV VCA IGM TITR FLD: NEGATIVE — SIGNIFICANT CHANGE UP
EOSINOPHIL # BLD AUTO: 0.06 K/UL — SIGNIFICANT CHANGE UP (ref 0–0.5)
EOSINOPHIL NFR BLD AUTO: 1.1 % — SIGNIFICANT CHANGE UP (ref 0–6)
FERRITIN SERPL-MCNC: 903 NG/ML — HIGH (ref 15–150)
GLUCOSE SERPL-MCNC: 106 MG/DL — HIGH (ref 70–99)
HAV IGM SER-ACNC: SIGNIFICANT CHANGE UP
HBV CORE IGM SER-ACNC: SIGNIFICANT CHANGE UP
HBV SURFACE AB SER-ACNC: REACTIVE
HBV SURFACE AG SER-ACNC: SIGNIFICANT CHANGE UP
HCT VFR BLD CALC: 35.3 % — SIGNIFICANT CHANGE UP (ref 34.5–45)
HCV AB S/CO SERPL IA: 0.09 S/CO — SIGNIFICANT CHANGE UP (ref 0–0.99)
HCV AB SERPL-IMP: SIGNIFICANT CHANGE UP
HGB BLD-MCNC: 11.8 G/DL — SIGNIFICANT CHANGE UP (ref 11.5–15.5)
HIV 1+2 AB+HIV1 P24 AG SERPL QL IA: SIGNIFICANT CHANGE UP
IMM GRANULOCYTES NFR BLD AUTO: 0.5 % — SIGNIFICANT CHANGE UP (ref 0–1.5)
INR BLD: 1.08 RATIO — SIGNIFICANT CHANGE UP (ref 0.88–1.16)
IRON SATN MFR SERPL: 11 % — LOW (ref 14–50)
IRON SATN MFR SERPL: 20 UG/DL — LOW (ref 30–160)
LEGIONELLA AG UR QL: NEGATIVE — SIGNIFICANT CHANGE UP
LYMPHOCYTES # BLD AUTO: 0.98 K/UL — LOW (ref 1–3.3)
LYMPHOCYTES # BLD AUTO: 17.2 % — SIGNIFICANT CHANGE UP (ref 13–44)
MAGNESIUM SERPL-MCNC: 2.2 MG/DL — SIGNIFICANT CHANGE UP (ref 1.6–2.6)
MCHC RBC-ENTMCNC: 29.8 PG — SIGNIFICANT CHANGE UP (ref 27–34)
MCHC RBC-ENTMCNC: 33.4 GM/DL — SIGNIFICANT CHANGE UP (ref 32–36)
MCV RBC AUTO: 89.1 FL — SIGNIFICANT CHANGE UP (ref 80–100)
MONOCYTES # BLD AUTO: 0.39 K/UL — SIGNIFICANT CHANGE UP (ref 0–0.9)
MONOCYTES NFR BLD AUTO: 6.8 % — SIGNIFICANT CHANGE UP (ref 2–14)
MRSA PCR RESULT.: SIGNIFICANT CHANGE UP
NEUTROPHILS # BLD AUTO: 4.24 K/UL — SIGNIFICANT CHANGE UP (ref 1.8–7.4)
NEUTROPHILS NFR BLD AUTO: 74.2 % — SIGNIFICANT CHANGE UP (ref 43–77)
NIGHT BLUE STAIN TISS: SIGNIFICANT CHANGE UP
PHOSPHATE SERPL-MCNC: 3.1 MG/DL — SIGNIFICANT CHANGE UP (ref 2.5–4.5)
PLATELET # BLD AUTO: 253 K/UL — SIGNIFICANT CHANGE UP (ref 150–400)
POTASSIUM SERPL-MCNC: 3.8 MMOL/L — SIGNIFICANT CHANGE UP (ref 3.5–5.3)
POTASSIUM SERPL-SCNC: 3.8 MMOL/L — SIGNIFICANT CHANGE UP (ref 3.5–5.3)
PROT SERPL-MCNC: 6.9 G/DL — SIGNIFICANT CHANGE UP (ref 6–8.3)
PROTHROM AB SERPL-ACNC: 12.2 SEC — SIGNIFICANT CHANGE UP (ref 10–13.1)
RBC # BLD: 3.96 M/UL — SIGNIFICANT CHANGE UP (ref 3.8–5.2)
RBC # FLD: 12.7 % — SIGNIFICANT CHANGE UP (ref 10.3–14.5)
S AUREUS DNA NOSE QL NAA+PROBE: SIGNIFICANT CHANGE UP
SODIUM SERPL-SCNC: 137 MMOL/L — SIGNIFICANT CHANGE UP (ref 135–145)
SPECIMEN SOURCE: SIGNIFICANT CHANGE UP
SPECIMEN SOURCE: SIGNIFICANT CHANGE UP
TIBC SERPL-MCNC: 186 UG/DL — LOW (ref 220–430)
TSH SERPL-MCNC: 1.93 UIU/ML — SIGNIFICANT CHANGE UP (ref 0.27–4.2)
UIBC SERPL-MCNC: 166 UG/DL — SIGNIFICANT CHANGE UP (ref 110–370)
WBC # BLD: 5.71 K/UL — SIGNIFICANT CHANGE UP (ref 3.8–10.5)
WBC # FLD AUTO: 5.71 K/UL — SIGNIFICANT CHANGE UP (ref 3.8–10.5)

## 2019-09-16 PROCEDURE — 99222 1ST HOSP IP/OBS MODERATE 55: CPT

## 2019-09-16 PROCEDURE — 99223 1ST HOSP IP/OBS HIGH 75: CPT | Mod: GC

## 2019-09-16 RX ORDER — PIPERACILLIN AND TAZOBACTAM 4; .5 G/20ML; G/20ML
3.38 INJECTION, POWDER, LYOPHILIZED, FOR SOLUTION INTRAVENOUS EVERY 8 HOURS
Refills: 0 | Status: DISCONTINUED | OUTPATIENT
Start: 2019-09-16 | End: 2019-09-19

## 2019-09-16 RX ORDER — IBUPROFEN 200 MG
400 TABLET ORAL EVERY 6 HOURS
Refills: 0 | Status: DISCONTINUED | OUTPATIENT
Start: 2019-09-16 | End: 2019-09-21

## 2019-09-16 RX ORDER — INFLUENZA VIRUS VACCINE 15; 15; 15; 15 UG/.5ML; UG/.5ML; UG/.5ML; UG/.5ML
0.5 SUSPENSION INTRAMUSCULAR ONCE
Refills: 0 | Status: DISCONTINUED | OUTPATIENT
Start: 2019-09-16 | End: 2019-09-21

## 2019-09-16 RX ORDER — HEPARIN SODIUM 5000 [USP'U]/ML
5000 INJECTION INTRAVENOUS; SUBCUTANEOUS EVERY 8 HOURS
Refills: 0 | Status: DISCONTINUED | OUTPATIENT
Start: 2019-09-16 | End: 2019-09-21

## 2019-09-16 RX ORDER — PIPERACILLIN AND TAZOBACTAM 4; .5 G/20ML; G/20ML
3.38 INJECTION, POWDER, LYOPHILIZED, FOR SOLUTION INTRAVENOUS ONCE
Refills: 0 | Status: COMPLETED | OUTPATIENT
Start: 2019-09-16 | End: 2019-09-16

## 2019-09-16 RX ADMIN — PIPERACILLIN AND TAZOBACTAM 25 GRAM(S): 4; .5 INJECTION, POWDER, LYOPHILIZED, FOR SOLUTION INTRAVENOUS at 13:24

## 2019-09-16 RX ADMIN — Medication 400 MILLIGRAM(S): at 13:22

## 2019-09-16 RX ADMIN — AZITHROMYCIN 250 MILLIGRAM(S): 500 TABLET, FILM COATED ORAL at 22:00

## 2019-09-16 RX ADMIN — HEPARIN SODIUM 5000 UNIT(S): 5000 INJECTION INTRAVENOUS; SUBCUTANEOUS at 13:24

## 2019-09-16 RX ADMIN — PIPERACILLIN AND TAZOBACTAM 200 GRAM(S): 4; .5 INJECTION, POWDER, LYOPHILIZED, FOR SOLUTION INTRAVENOUS at 03:50

## 2019-09-16 RX ADMIN — Medication 400 MILLIGRAM(S): at 14:26

## 2019-09-16 RX ADMIN — PIPERACILLIN AND TAZOBACTAM 25 GRAM(S): 4; .5 INJECTION, POWDER, LYOPHILIZED, FOR SOLUTION INTRAVENOUS at 22:01

## 2019-09-16 RX ADMIN — HEPARIN SODIUM 5000 UNIT(S): 5000 INJECTION INTRAVENOUS; SUBCUTANEOUS at 06:55

## 2019-09-16 RX ADMIN — HEPARIN SODIUM 5000 UNIT(S): 5000 INJECTION INTRAVENOUS; SUBCUTANEOUS at 22:00

## 2019-09-16 NOTE — H&P ADULT - ATTENDING COMMENTS
38F p/w fever and productive cough w/ blood-tinged sputum found to have right upper lobe pneumonia and transaminitis with hepatomegaly with risk factors for tuberculosis in travel to [place] and family with fever and chronic cough.  - cover for pneumonia with vancomycin and zosyn, AFB sputum, sputum culture, blood culture, pulm consult in am. isolation precautions  - noted hepatomegaly w/ transaminitis. send studies as above and cover with broad antibiotics.    Patient assigned to me by night hospitalist in charge for management and care for patient for this evening only. Care to be resumed by day hospitalist at 08:00 in the morning and thereafter. 38F p/w fever and productive cough w/ blood-tinged sputum found to have right upper lobe pneumonia and transaminitis with hepatomegaly with risk factors for tuberculosis in travel to [place] and family with fever and chronic cough.  - cover for pneumonia with vancomycin/zosyn/azithromycin, AFB sputum, sputum culture, blood culture, urine legionella ag, pulm consult in am. isolation precautions  - noted hepatomegaly w/ transaminitis. send studies as above and cover with broad antibiotics.    Patient assigned to me by night hospitalist in charge for management and care for patient for this evening only. Care to be resumed by day hospitalist at 08:00 in the morning and thereafter.

## 2019-09-16 NOTE — H&P ADULT - PROBLEM SELECTOR PLAN 1
Pt p/w fevers and cough (not septic) found to have RUL PNA on CT imaging s/p failed outpt treatment with cephalexin. S/p Vanc/Azithro/CTX in the ED.   - C/w Vanc/Azithro/CTX (day 1/5)  - F/u blood cxs sent in the ED  - F/u urine cxs sent in the ED  - Sputum cx  - F/u legionella sent in the ED Pt p/w fevers and cough (not septic) found to have RUL PNA on CT imaging s/p failed outpt treatment with cephalexin. S/p Vanc/Azithro/CTX in the ED. With recent travel hx, hx of night sweats, and hemoptysis there is concern for TB infection. Klebsiella PNA may also cause hemotpysis  - Contact pre-cautions  - AFB x3  - C/w Vanc/Azithro (day 1/5)  - Will start Zosyn to cover for Kleb. PNA.   - F/u blood cxs sent in the ED  - F/u urine cxs sent in the ED  - F/u legionella sent in the ED Pt p/w fevers and cough (not septic) found to have RUL PNA on CT imaging s/p failed outpt treatment with cephalexin. S/p Vanc/Azithro/CTX in the ED. With recent travel hx, hx of night sweats, and hemoptysis there is concern for TB infection. Klebsiella PNA may also cause hemotpysis  - Contact pre-cautions  - AFB x3  - C/w Vanc/Azithro (day 1/5)  - Will start Zosyn to cover for Kleb. PNA.   - F/u blood cxs sent in the ED  - F/u urine cxs sent in the ED  - F/u legionella sent in the ED  - Pulm consult in the AM for possible bronchoscopy w/ BAL Pt p/w fevers and cough (not septic) found to have RUL PNA on CT imaging s/p failed outpt treatment with cephalexin. S/p Vanc/Azithro/CTX in the ED. With recent travel hx, hx of night sweats, and hemoptysis there is concern for TB infection. Klebsiella PNA may also cause hemoptysis  - isolation pre-cautions  - will need AFB x3  - C/w Vanc/Azithro (day 1/5)  - Will start Zosyn to cover for Kleb. PNA.   - F/u blood cxs sent in the ED  - F/u urine cxs sent in the ED  - F/u legionella sent in the ED  - Pulm consult in the AM for possible bronchoscopy w/ BAL

## 2019-09-16 NOTE — PROVIDER CONTACT NOTE (OTHER) - ASSESSMENT
Pt c/o headache 3/10 nonradiating. Pt felt some relief after eating. No other complaints at this time.  @11:48 T: 100.1 HR: 90 BP: 106/71 RR: 16 95% RA pt not on tele
@ 11:32 99.4  @11:48 100.1

## 2019-09-16 NOTE — H&P ADULT - NSHPLABSRESULTS_GEN_ALL_CORE
13.1   5.9   )-----------( 255      ( 15 Sep 2019 18:15 )             39.3     Auto Eosinophil # 0.1   / Auto Eosinophil % 1.9   / Auto Neutrophil # 4.0   / Auto Neutrophil % 68.0  / BANDS % x        09-15    138  |  99  |  6<L>  ----------------------------<  102<H>  3.3<L>   |  23  |  0.60    Ca    9.2      15 Sep 2019 18:15  TPro  7.3  /  Alb  3.8  /  TBili  0.5  /  DBili  x   /  AST  317<H>  /  ALT  604<H>  /  AlkPhos  475<H>  09-15    PT/INR - ( 15 Sep 2019 18:15 )   PT: 12.2 sec;   INR: 1.07 ratio         PTT - ( 15 Sep 2019 18:15 )  PTT:32.1 sec      Urinalysis Basic - ( 15 Sep 2019 19:13 )    Color: Colorless / Appearance: Clear / S.004 / pH: x  Gluc: x / Ketone: Negative  / Bili: Negative / Urobili: Negative   Blood: x / Protein: Negative / Nitrite: Negative   Leuk Esterase: Moderate / RBC: 7 /hpf / WBC 3 /HPF   Sq Epi: x / Non Sq Epi: 1 /hpf / Bacteria: Negative          RESPIRATORY  VENT:    ABG:     VBG: 13.1   5.9   )-----------( 255      ( 15 Sep 2019 18:15 )             39.3     Auto Eosinophil # 0.1   / Auto Eosinophil % 1.9   / Auto Neutrophil # 4.0   / Auto Neutrophil % 68.0  / BANDS % x        09-15    138  |  99  |  6<L>  ----------------------------<  102<H>  3.3<L>   |  23  |  0.60    Ca    9.2      15 Sep 2019 18:15  TPro  7.3  /  Alb  3.8  /  TBili  0.5  /  DBili  x   /  AST  317<H>  /  ALT  604<H>  /  AlkPhos  475<H>  09-15    PT/INR - ( 15 Sep 2019 18:15 )   PT: 12.2 sec;   INR: 1.07 ratio         PTT - ( 15 Sep 2019 18:15 )  PTT:32.1 sec      Urinalysis Basic - ( 15 Sep 2019 19:13 )    Color: Colorless / Appearance: Clear / S.004 / pH: x  Gluc: x / Ketone: Negative  / Bili: Negative / Urobili: Negative   Blood: x / Protein: Negative / Nitrite: Negative   Leuk Esterase: Moderate / RBC: 7 /hpf / WBC 3 /HPF   Sq Epi: x / Non Sq Epi: 1 /hpf / Bacteria: Negative I personally reviewed all available labs, imaging and ekg                        13.1   5.9   )-----------( 255      ( 15 Sep 2019 18:15 )             39.3   Auto Eosinophil # 0.1   / Auto Eosinophil % 1.9   / Auto Neutrophil # 4.0   / Auto Neutrophil % 68.0  / BANDS % x      138  |  99  |  6<L>  ----------------------------<  102<H>  3.3<L>   |  23  |  0.60  Ca    9.2      15 Sep 2019 18:15  TPro  7.3  /  Alb  3.8  /  TBili  0.5  /  DBili  x   /  AST  317<H>  /  ALT  604<H>  /  AlkPhos  475<H>  09-15  PT/INR - ( 15 Sep 2019 18:15 )   PT: 12.2 sec;   INR: 1.07 ratio    PTT - ( 15 Sep 2019 18:15 )  PTT:32.1 sec  Urinalysis Basic - ( 15 Sep 2019 19:13 )  Color: Colorless / Appearance: Clear / S.004 / pH: x  Gluc: x / Ketone: Negative  / Bili: Negative / Urobili: Negative   Blood: x / Protein: Negative / Nitrite: Negative   Leuk Esterase: Moderate / RBC: 7 /hpf / WBC 3 /HPF   Sq Epi: x / Non Sq Epi: 1 /hpf / Bacteria: Negative    CXR w/ right upper lobe mass  < from: CT Chest w/ IV Cont (09.15.19 @ 21:41) >  CHEST:   LUNGS AND LARGE AIRWAYS: Patent central airways. Right upper lobe   consolidation with extensive right upper lobe tree-in-bud opacities,   consistent with endobronchial spread. Additional nodular opacities in the   superior segment of the right lower lobe.  PLEURA: Trace right pleural effusion. No pneumothorax.  VESSELS: Within normal limits.  HEART: Heart size is normal. No pericardial effusion.  MEDIASTINUM AND REVA: Right upper lower paratracheal lymphadenopathy with   precarinal lymph node measuring up to 1.2 cm in short axis. No axillary   or left hilar lymphadenopathy.  CHEST WALL AND LOWER NECK: Within normal limits.  ABDOMEN AND PELVIS:  LIVER: Hepatomegaly measuring 18.7 cm. Right hepatic lobe cyst and   subcentimeter hypodense lesion too small to characterize.  BILE DUCTS: Normal caliber.  GALLBLADDER: Within normal limits.  SPLEEN: Within normal limits.  PANCREAS: Within normal limits.  ADRENALS: Within normal limits.  KIDNEYS/URETERS: Within normal limits.  BLADDER: Within normal limits.  REPRODUCTIVE ORGANS: Uterus and adnexa within normal limits.  BOWEL: No bowel obstruction or evident bowel inflammation. Appendix is   normal.  PERITONEUM: No pneumoperitoneum or ascites.  VESSELS: Within normal limits.  RETROPERITONEUM/LYMPH NODES: No lymphadenopathy.    ABDOMINAL WALL: Fat-containing umbilical hernia.  BONES: Mild degenerative changes of the spine.     IMPRESSION:   CT chest: Right upper lobe pneumonia with small nodular opacities in the   superior segment of the right lower lobe, likely reflecting multifocal   infection. Follow-up chest CT in 6-8 weeks recommended to ensure   resolution.  CT abdomen and pelvis: No acute intra-abdominal pathology. Hepatomegaly   measuring 18.7 cm  < end of copied text >  EKG: NSR 77bpm no ST elevation c/w STEMI

## 2019-09-16 NOTE — PROGRESS NOTE ADULT - SUBJECTIVE AND OBJECTIVE BOX
HUMBERTO Trejo, PGY 1  Pager [phone]    Patient is a 38y old  Female who presents with a chief complaint of 38F w/ fever and productive cough (16 Sep 2019 01:07)    SUBJECTIVE / OVERNIGHT EVENTS: No acute events overnight. Patient seen and examined at bedside this AM, A&Ox3. On evaluation today, patient reports her cough is improving, still productive of sputum with occasional blood streaking. States she feels short of breath when she is laying down, prefers to sit upright in bed or in a chair. Also complaining of left-sided headache, which was relieved s/p tylenol last night. D No overnight events. No complaints this AM. Patient denies CP, abdominal pain, nausea/vomiting/diarrhea, fevers/chills.    MEDICATIONS  (STANDING):  azithromycin  IVPB 500 milliGRAM(s) IV Intermittent every 24 hours  heparin  Injectable 5000 Unit(s) SubCutaneous every 8 hours  influenza   Vaccine 0.5 milliLiter(s) IntraMuscular once  piperacillin/tazobactam IVPB.. 3.375 Gram(s) IV Intermittent every 8 hours    MEDICATIONS  (PRN):    VITAL SIGNS:  T(C): 37.6 (19 @ 04:51), Max: 37.8 (09-15-19 @ 21:23)  HR: 88 (19 @ 04:51) (79 - 98)  BP: 99/65 (19 @ 04:51) (94/57 - 113/76)  RR: 18 (19 @ 04:51) (16 - 20)  SpO2: 97% (19 @ 04:51) (96% - 98%)    PHYSICAL EXAM  GENERAL: NAD, well-developed  NEURO: A&Ox3, motor strength intact in 4/4 extremities, sensation intact  HEAD:  Atraumatic, Normocephalic  EYES: conjunctiva and sclera clear  NECK: Supple, No JVD, no lymphadenopathy, no thyromegaly  CHEST/LUNG: diminished breath sounds RUL, other fields clear to auscultation  HEART: Regular rate and rhythm; No murmurs, rubs, or gallops  ABDOMEN: Soft, Nontender, Nondistended; Bowel sounds present, no masses.  EXTREMITIES:  2+ Peripheral Pulses, No clubbing, cyanosis, or edema  SKIN: Warm, dry, intact, no rashes or lesions  PSYCH: affect and behavior appropriate for setting    LABS:                        13.1   5.9   )-----------( 255      ( 15 Sep 2019 18:15 )             39.3         137  |  100  |  4<L>  ----------------------------<  106<H>  3.8   |  23  |  0.49<L>    Ca    8.9      16 Sep 2019 06:49  Phos  3.1       Mg     2.2         TPro  6.9  /  Alb  3.7  /  TBili  0.5  /  DBili  x   /  AST  336<H>  /  ALT  556<H>  /  AlkPhos  445<H>      PT/INR - ( 15 Sep 2019 18:15 )   PT: 12.2 sec;   INR: 1.07 ratio         PTT - ( 15 Sep 2019 18:15 )  PTT:32.1 sec      Urinalysis Basic - ( 15 Sep 2019 19:13 )    Color: Colorless / Appearance: Clear / S.004 / pH: x  Gluc: x / Ketone: Negative  / Bili: Negative / Urobili: Negative   Blood: x / Protein: Negative / Nitrite: Negative   Leuk Esterase: Moderate / RBC: 7 /hpf / WBC 3 /HPF   Sq Epi: x / Non Sq Epi: 1 /hpf / Bacteria: Negative HUMBERTO Trejo, PGY 1  Pager [phone]    Patient is a 38y old  Female who presents with a chief complaint of 38F w/ fever and productive cough (16 Sep 2019 01:07)    SUBJECTIVE / OVERNIGHT EVENTS: No acute events overnight. Patient seen and examined at bedside this AM, A&Ox3. On evaluation today, patient reports her cough is improving, still productive of sputum with occasional blood streaking. States she feels short of breath when she is laying down, prefers to sit upright in bed or in a chair. Also complaining of left-sided headache, which was relieved s/p tylenol last night. D No overnight events. No complaints this AM. Patient denies CP, abdominal pain, nausea/vomiting/diarrhea, fevers/chills.    MEDICATIONS  (STANDING):  azithromycin  IVPB 500 milliGRAM(s) IV Intermittent every 24 hours  heparin  Injectable 5000 Unit(s) SubCutaneous every 8 hours  influenza   Vaccine 0.5 milliLiter(s) IntraMuscular once  piperacillin/tazobactam IVPB.. 3.375 Gram(s) IV Intermittent every 8 hours    MEDICATIONS  (PRN):    VITAL SIGNS:  T(C): 37.6 (19 @ 04:51), Max: 37.8 (09-15-19 @ 21:23)  HR: 88 (19 @ 04:51) (79 - 98)  BP: 99/65 (19 @ 04:51) (94/57 - 113/76)  RR: 18 (19 @ 04:51) (16 - 20)  SpO2: 97% (19 @ 04:51) (96% - 98%)    PHYSICAL EXAM  GENERAL: NAD, well-developed  NEURO: A&Ox3, motor strength intact in 4/4 extremities, sensation intact  HEAD:  Atraumatic, Normocephalic  EYES: conjunctiva and sclera clear  NECK: Supple, No JVD, no lymphadenopathy, no thyromegaly  CHEST/LUNG: diminished breath sounds RUL, other fields clear to auscultation  HEART: Regular rate and rhythm; No murmurs, rubs, or gallops  ABDOMEN: Soft, Nontender, Nondistended; Bowel sounds present, no masses.  EXTREMITIES:  2+ Peripheral Pulses, No clubbing, cyanosis, or edema  SKIN: Warm, dry, intact, no rashes or lesions  PSYCH: affect and behavior appropriate for setting    LABS:                        13.1   5.9   )-----------( 255      ( 15 Sep 2019 18:15 )             39.3         137  |  100  |  4<L>  ----------------------------<  106<H>  3.8   |  23  |  0.49<L>    Ca    8.9      16 Sep 2019 06:49  Phos  3.1       Mg     2.2         TPro  6.9  /  Alb  3.7  /  TBili  0.5  /  DBili  x   /  AST  336<H>  /  ALT  556<H>  /  AlkPhos  445<H>      PT/INR - ( 15 Sep 2019 18:15 )   PT: 12.2 sec;   INR: 1.07 ratio         PTT - ( 15 Sep 2019 18:15 )  PTT:32.1 sec      Urinalysis Basic - ( 15 Sep 2019 19:13 )    Color: Colorless / Appearance: Clear / S.004 / pH: x  Gluc: x / Ketone: Negative  / Bili: Negative / Urobili: Negative   Blood: x / Protein: Negative / Nitrite: Negative   Leuk Esterase: Moderate / RBC: 7 /hpf / WBC 3 /HPF   Sq Epi: x / Non Sq Epi: 1 /hpf / Bacteria: Negative    < from: CT Chest w/ IV Cont (09.15.19 @ 21:41) >  IMPRESSION:     CT chest: Right upper lobe pneumonia with small nodular opacities in the   superior segment of the right lower lobe, likely reflecting multifocal   infection. Follow-up chest CT in 6-8 weeks recommended to ensure   resolution.    CT abdomen and pelvis: No acute intra-abdominal pathology. Hepatomegaly   measuring 18.7 cm    < end of copied text >    < from: Xray Chest 2 Views PA/Lat (09.15.19 @ 18:51) >    IMPRESSION:     New right upper lobeopacity compatible with pneumonia.    < end of copied text >

## 2019-09-16 NOTE — PROGRESS NOTE ADULT - PROBLEM SELECTOR PLAN 1
Pt p/w fevers and cough (not septic) found to have RUL PNA on CT imaging s/p failed outpt treatment with cephalexin. S/p Vanc/Azithro/CTX in the ED. With recent travel hx, hx of night sweats, and hemoptysis there is concern for TB infection. Klebsiella PNA may also cause hemoptysis  - isolation pre-cautions  - will need AFB x3 (1 sent yesterday from ED)  - Continue zosyn/azithro (day 2/5)   - F/u blood cxs sent in the ED  - F/u urine cxs sent in the ED  - F/u legionella sent in the ED  - Pulm consult in the AM for possible bronchoscopy w/ BAL  - Will call ID for consult given concern for TB Pt p/w fevers and cough (not septic) found to have RUL PNA on CT imaging s/p failed outpt treatment with cephalexin. S/p Vanc/Azithro/CTX in the ED. With recent travel hx, hx of night sweats, and hemoptysis there is concern for TB infection. Klebsiella PNA may also cause hemoptysis  - isolation pre-cautions  - will need AFB x3 (1 sent yesterday from ED)  - Continue zosyn/azithro (day 2/5)   - F/u blood cxs sent in the ED  - F/u urine cxs sent in the ED  - F/u legionella sent in the ED  - Will call ID for consult given concern for TB

## 2019-09-16 NOTE — H&P ADULT - ASSESSMENT
Pt is a 39 yo F w/ no PMH p/w fevers and progressively worsening cough a/w CAP c/b transaminitis Pt is a 37 yo F w/ no PMH p/w fevers and progressively worsening cough a/w CAP c/b transaminitis and hepatomegaly concerning for TB vs kleb PNA

## 2019-09-16 NOTE — PROVIDER CONTACT NOTE (OTHER) - BACKGROUND
38YOF admitted for cough with bloody sputum, pt post recent uti. RUL pna, r/o TB. No pmh.
38YOF admitted with cough with bloody sputum

## 2019-09-16 NOTE — DISCHARGE NOTE PROVIDER - NSDCFUADDAPPT_GEN_ALL_CORE_FT
1. Please follow up with Dr. Frias on Tues September 24th at 1:00PM. Please call the office to give them your demographic information. Please bring you insurance and ID card to the appointment.    You will need to repeat your blood work to ensure your liver enzymes are improving on 9/24. You will need to repeat your chest x-ray in 4-8 weeks to ensure resolution of your pneumonia. 1. Please follow up with Dr. Frias on Tues September 24th at 1:00PM. Please call the office to give them your demographic information. Please bring you insurance and ID card to the appointment.    You will need to repeat your blood work to ensure your liver enzymes are improving on 9/24. You will need to repeat your chest x-ray in 4-8 weeks to ensure resolution of your pneumonia.    To schedule an appointment with hepatology, call the number listed for the gastroenterology office listed above and ask for an appointment with a hepatologist (a liver doctor).

## 2019-09-16 NOTE — PROGRESS NOTE ADULT - ASSESSMENT
Pt is a 37 yo F w/ no PMH p/w fevers and progressively worsening cough a/w CAP c/b transaminitis and hepatomegaly concerning for TB vs kleb PNA.

## 2019-09-16 NOTE — DISCHARGE NOTE PROVIDER - CARE PROVIDER_API CALL
Jacob Macdonald  [address]  [place] [address]  Phone: [phone]  Fax: (   )    -  Follow Up Time: Jacob Macdonald  [address]  [place] [address]  Phone: [phone]  Fax: (   )    -  Follow Up Time:     Eulalia Frias (DO)  [facility]  [address] [place] [address], [address] [place] [address]  Phone: [phone]  Fax: [phone]  Follow Up Time:

## 2019-09-16 NOTE — DISCHARGE NOTE PROVIDER - HOSPITAL COURSE
Pt is a 37 yo F w/ no PMH/PSH p/w fevers and cough x 1 week. Pt reported that she was in good health up until 1 week prior to this admission, when that evening she ordered take out, took a nap, and then woke up and felt feverish. Pt reported taking an oral temperature of 101 F. Pt reported that she took Tylenol that evening and became "sweaty" due to the fevers. Pt reported that she took notice of a cough about two days later which lead her to visit her PCP in the morning. Pt reported that her PCP prescribed her amoxicillin and was sent home. Pt reported that her cough and fevers continue to worsen prompting her to visit the ED that afternoon. Pt reported that they took an x-ray which was normal and found that she had a UTI. Pt reported that the ED providers switched her antibiotics to cephalexin. Pt additionally reported that she had a RUQ ultrasound however was not sure why. Chart reviewed that patient had a transaminitis and RUQ showed sludge in the gallbladder. Pt reported that she continued to have fevers w/ a Tmax of 104.5 on 9/14. Pt reported that 2 days prior to admission, she developed some blood in the sputum. Pt reported that the sputum was occasionally streaked w/ blood.         Of note: Pt reported recent travel to [place], [place] for 20 days in May and a trip to [place] from Aug. 16th - 24th. Pt reported that he son had a fever on August 21st. Pt reported no CP, abd pain, diarrhea, or dysuria.  Sick contact in Mother who is having productive cough and fever and does not see physicians.        In the ED,     T(C): 36.3, Max: 37.8 T(F): 97.4, Max: 100 HR: 79 (79 - 98) BP: 94/57  (94/57 - 113/76) RR: 18 (16 - 20) SpO2: 96% (96% - 98%)    ALK Phos: 475 AST/ALT: 317/604 Uhcg: negative UA: Moderate LE        CT Chest, A/P: Right upper lobe pneumonia with small nodular opacities in the superior segment of the right lower lobe, likely reflecting multifocal infection. No acute intra-abdominal pathology. Hepatomegaly measuring 18.7 cm.        Pt treated w/ tylenol, vancomycin, azithromycin, ceftriaxone, potassium chloride, 1L NS bolus x1.         On the floor, patient was placed on airborne isolation for TB. Initially received zosyn and azithromycin, was seen by ID and antibiotics were switched to _____. AFBs showed _____. Patient had an RUQ ultrasound to further characterize hepatomegaly, which showed _____. Patient's symptoms improved during the course of hospitalization. Patient is currently medically stable and appropriate for discharge home with close follow up in the primary care doctor's office. Pt is a 37 yo F w/ no PMH/PSH p/w fevers and cough x 1 week. Pt reported that she was in good health up until 1 week prior to this admission, when that evening she ordered take out, took a nap, and then woke up and felt feverish. Pt reported taking an oral temperature of 101 F. Pt reported that she took Tylenol that evening and became "sweaty" due to the fevers. Pt reported that she took notice of a cough about two days later which lead her to visit her PCP in the morning. Pt reported that her PCP prescribed her amoxicillin and was sent home. Pt reported that her cough and fevers continue to worsen prompting her to visit the ED that afternoon. Pt reported that they took an x-ray which was normal and found that she had a UTI. Pt reported that the ED providers switched her antibiotics to cephalexin. Pt additionally reported that she had a RUQ ultrasound however was not sure why. Chart reviewed that patient had a transaminitis and RUQ showed sludge in the gallbladder. Pt reported that she continued to have fevers w/ a Tmax of 104.5 on 9/14. Pt reported that 2 days prior to admission, she developed some blood in the sputum. Pt reported that the sputum was occasionally streaked w/ blood.         Of note: Pt reported recent travel to [place], [place] for 20 days in May and a trip to [place] from Aug. 16th - 24th. Pt reported that he son had a fever on August 21st. Pt reported no CP, abd pain, diarrhea, or dysuria.  Sick contact in Mother who is having productive cough and fever and does not see physicians.        In the ED,     T(C): 36.3, Max: 37.8 T(F): 97.4, Max: 100 HR: 79 (79 - 98) BP: 94/57  (94/57 - 113/76) RR: 18 (16 - 20) SpO2: 96% (96% - 98%)    ALK Phos: 475 AST/ALT: 317/604 Uhcg: negative UA: Moderate LE        CT Chest, A/P: Right upper lobe pneumonia with small nodular opacities in the superior segment of the right lower lobe, likely reflecting multifocal infection. No acute intra-abdominal pathology. Hepatomegaly measuring 18.7 cm.        Pt treated w/ tylenol, vancomycin, azithromycin, ceftriaxone, potassium chloride, 1L NS bolus x1.         On the floor, patient was placed on airborne isolation for TB. Initially received zosyn and azithromycin, was seen by ID and antibiotics were switched to _____. AFBs were negative x3 and isolation precautions were discontinued. Patient had an RUQ ultrasound to further characterize hepatomegaly, which showed _____. Patient's symptoms improved during the course of hospitalization. Patient is currently medically stable and appropriate for discharge home with close follow up in the primary care doctor's office. Pt is a 39 yo F w/ no PMH/PSH p/w fevers and cough x 1 week. Pt reported that she was in good health up until 1 week prior to this admission, when that evening she ordered take out, took a nap, and then woke up and felt feverish. Pt reported taking an oral temperature of 101 F. Pt reported that she took Tylenol that evening and became "sweaty" due to the fevers. Pt reported that she took notice of a cough about two days later which lead her to visit her PCP in the morning. Pt reported that her PCP prescribed her amoxicillin and was sent home. Pt reported that her cough and fevers continue to worsen prompting her to visit the ED that afternoon. Pt reported that they took an x-ray which was normal and found that she had a UTI. Pt reported that the ED providers switched her antibiotics to cephalexin. Pt additionally reported that she had a RUQ ultrasound however was not sure why. Chart reviewed that patient had a transaminitis and RUQ showed sludge in the gallbladder. Pt reported that she continued to have fevers w/ a Tmax of 104.5 on 9/14. Pt reported that 2 days prior to admission, she developed some blood in the sputum. Pt reported that the sputum was occasionally streaked w/ blood.         Of note: Pt reported recent travel to [place], [place] for 20 days in May and a trip to [place] from Aug. 16th - 24th. Pt reported that he son had a fever on August 21st. Pt reported no CP, abd pain, diarrhea, or dysuria.  Sick contact in Mother who is having productive cough and fever and does not see physicians.        In the ED,     T(C): 36.3, Max: 37.8 T(F): 97.4, Max: 100 HR: 79 (79 - 98) BP: 94/57  (94/57 - 113/76) RR: 18 (16 - 20) SpO2: 96% (96% - 98%)    ALK Phos: 475 AST/ALT: 317/604 Uhcg: negative UA: Moderate LE        CT Chest, A/P: Right upper lobe pneumonia with small nodular opacities in the superior segment of the right lower lobe, likely reflecting multifocal infection. No acute intra-abdominal pathology. Hepatomegaly measuring 18.7 cm.        Pt treated w/ tylenol, vancomycin, azithromycin, ceftriaxone, potassium chloride, 1L NS bolus x1.         On the floor, patient was placed on airborne isolation for TB. Initially received zosyn and azithromycin, was seen by ID and antibiotics were switched to _____. AFBs were negative x3 and isolation precautions were discontinued. Patient had an RUQ ultrasound to further characterize hepatomegaly, which was read as normal. Liver function tests continued to downtrend during the admission. Patient's symptoms improved during the course of hospitalization. Patient is currently medically stable and appropriate for discharge home with close follow up in the primary care doctor's office. Pt is a 39 yo F w/ no PMH/PSH p/w fevers and cough x 1 week. Pt reported that she was in good health up until 1 week prior to this admission, when that evening she ordered take out, took a nap, and then woke up and felt feverish. Pt reported taking an oral temperature of 101 F. Pt reported that she took Tylenol that evening and became "sweaty" due to the fevers. Pt reported that she took notice of a cough about two days later which lead her to visit her PCP in the morning. Pt reported that her PCP prescribed her amoxicillin and was sent home. Pt reported that her cough and fevers continue to worsen prompting her to visit the ED that afternoon. Pt reported that they took an x-ray which was normal and found that she had a UTI. Pt reported that the ED providers switched her antibiotics to cephalexin. Pt additionally reported that she had a RUQ ultrasound however was not sure why. Chart reviewed that patient had a transaminitis and RUQ showed sludge in the gallbladder. Pt reported that she continued to have fevers w/ a Tmax of 104.5 on 9/14. Pt reported that 2 days prior to admission, she developed some blood in the sputum. Pt reported that the sputum was occasionally streaked w/ blood.         Of note: Pt reported recent travel to [place], [place] for 20 days in May and a trip to [place] from Aug. 16th - 24th. Pt reported that he son had a fever on August 21st. Pt reported no CP, abd pain, diarrhea, or dysuria.  Sick contact in Mother who is having productive cough and fever and does not see physicians.        In the ED,     T(C): 36.3, Max: 37.8 T(F): 97.4, Max: 100 HR: 79 (79 - 98) BP: 94/57  (94/57 - 113/76) RR: 18 (16 - 20) SpO2: 96% (96% - 98%)    ALK Phos: 475 AST/ALT: 317/604 Uhcg: negative UA: Moderate LE        CT Chest, A/P: Right upper lobe pneumonia with small nodular opacities in the superior segment of the right lower lobe, likely reflecting multifocal infection. No acute intra-abdominal pathology. Hepatomegaly measuring 18.7 cm.        Pt treated w/ tylenol, vancomycin, azithromycin, ceftriaxone, potassium chloride, 1L NS bolus x1.         On the floor, patient was placed on airborne isolation for TB. Initially received zosyn and azithromycin, was seen by ID and antibiotics were switched to levaquin x 3 days to complete course. AFBs were negative x3 and isolation precautions were discontinued. Patient had an RUQ ultrasound to further characterize hepatomegaly, which was read as normal. MRCP showed _____. Liver function tests continued to downtrend during the admission. Patient's symptoms improved during the course of hospitalization. Patient is currently medically stable and appropriate for discharge home with close follow up in the primary care doctor's office. Pt is a 39 yo F w/ no PMH/PSH p/w fevers and cough x 1 week. Pt reported that she was in good health up until 1 week prior to this admission, when that evening she ordered take out, took a nap, and then woke up and felt feverish. Pt reported taking an oral temperature of 101 F. Pt reported that she took Tylenol that evening and became "sweaty" due to the fevers. Pt reported that she took notice of a cough about two days later which lead her to visit her PCP in the morning. Pt reported that her PCP prescribed her amoxicillin and was sent home. Pt reported that her cough and fevers continue to worsen prompting her to visit the ED that afternoon. Pt reported that they took an x-ray which was normal and found that she had a UTI. Pt reported that the ED providers switched her antibiotics to cephalexin. Pt additionally reported that she had a RUQ ultrasound however was not sure why. Chart reviewed that patient had a transaminitis and RUQ showed sludge in the gallbladder. Pt reported that she continued to have fevers w/ a Tmax of 104.5 on 9/14. Pt reported that 2 days prior to admission, she developed some blood in the sputum. Pt reported that the sputum was occasionally streaked w/ blood.         Of note: Pt reported recent travel to [place], [place] for 20 days in May and a trip to [place] from Aug. 16th - 24th. Pt reported that he son had a fever on August 21st. Pt reported no CP, abd pain, diarrhea, or dysuria.  Sick contact in Mother who is having productive cough and fever and does not see physicians.        In the ED,     T(C): 36.3, Max: 37.8 T(F): 97.4, Max: 100 HR: 79 (79 - 98) BP: 94/57  (94/57 - 113/76) RR: 18 (16 - 20) SpO2: 96% (96% - 98%)    ALK Phos: 475 AST/ALT: 317/604 Uhcg: negative UA: Moderate LE        CT Chest, A/P: Right upper lobe pneumonia with small nodular opacities in the superior segment of the right lower lobe, likely reflecting multifocal infection. No acute intra-abdominal pathology. Hepatomegaly measuring 18.7 cm.        Pt treated w/ tylenol, vancomycin, azithromycin, ceftriaxone, potassium chloride, 1L NS bolus x1.         On the floor, patient was placed on airborne isolation for TB. Initially received zosyn and azithromycin, was seen by ID and antibiotics were switched to levaquin x 3 days to complete course. AFBs were negative x3 and isolation precautions were discontinued. Patient had an RUQ ultrasound to further characterize hepatomegaly, which was read as normal. MRCP showed no biliary dilatation. Liver function tests continued to downtrend during the admission. Patient's symptoms improved during the course of hospitalization. Patient is currently medically stable and appropriate for discharge home with close follow up in the primary care doctor's office.

## 2019-09-16 NOTE — DISCHARGE NOTE PROVIDER - PROVIDER TOKENS
FREE:[LAST:[Torres],FIRST:[Jacob],PHONE:[phone]],FAX:[(   )    -],ADDRESS:[[address] [place] [address] [address] [place] [address]]] FREE:[LAST:[Torres],FIRST:[Jacob],PHONE:[phone]],FAX:[(   )    -],ADDRESS:[[address] [place] [address] [address] [place] [address]]],PROVIDER:[TOKEN:[[id number]]]

## 2019-09-16 NOTE — H&P ADULT - PROBLEM SELECTOR PLAN 2
Pt w/ transaminitis initially discovered during ED visit on 9/12. CT imaging reveals hepatomegaly. EBV serology sent in the ED.  - F/u EBV serology  - Acute hepatitis panel  - Trend CMP, CBC, coags Pt w/ transaminitis initially discovered during ED visit on 9/12. CT imaging reveals hepatomegaly. EBV serology sent in the ED.  - F/u EBV serology  - Acute hepatitis panel  -  HBsAb, HBsAg, HBcAb, HCV Ab, HAV IgG/IgM to r/o viral hepatitides as cause of cirrhosis  -  CINTHYA, ASMA, LKM Ab, IgG subsets, quant IgM, IgA, AMA to r/o autoimmune disease  -  alpha-1-antityrypsin, iron panel and ferritin, ceruloplasmin  - avoid all hepatotoxic agents  - Trend CMP, CBC, coags Pt w/ transaminitis initially discovered during ED visit on 9/12. CT imaging reveals hepatomegaly. EBV serology sent in the ED.  - F/u EBV serology  - Acute hepatitis panel  -  HBsAb, HBsAg, HBcAb, HCV Ab, HAV IgG/IgM to r/o viral hepatitides   -  CINTHYA, ASMA, LKM Ab, IgG subsets, quant IgM, IgA, AMA to r/o autoimmune disease  -  alpha-1-antityrypsin, iron panel and ferritin, ceruloplasmin  - avoid all hepatotoxic agents  - Trend CMP, CBC, coags

## 2019-09-16 NOTE — H&P ADULT - NSHPREVIEWOFSYSTEMS_GEN_ALL_CORE
CONSTITUTIONAL:  No weight loss, fever, chills, weakness or fatigue.  HEENT:  Eyes:  No visual loss, blurred vision, double vision or yellow sclerae. Ears, Nose, Throat:  No hearing loss, sneezing, congestion, runny nose or sore throat.  SKIN:  No rash or itching.  CARDIOVASCULAR:  No chest pain, chest pressure or chest discomfort. No palpitations.  RESPIRATORY:  No shortness of breath, cough or sputum.  GASTROINTESTINAL:  No anorexia, nausea, vomiting or diarrhea. No abdominal pain or blood.  GENITOURINARY:  Denies hematuria, dysuria.   NEUROLOGICAL:  No headache, dizziness, syncope, paralysis, ataxia, numbness or tingling in the extremities. No change in bowel or bladder control.  MUSCULOSKELETAL:  No muscle, back pain, joint pain or stiffness.  HEMATOLOGIC:  No anemia, bleeding or bruising.  LYMPHATICS:  No enlarged nodes.   PSYCHIATRIC:  No history of depression or anxiety.  ENDOCRINOLOGIC:  No reports of sweating, cold or heat intolerance. No polyuria or polydipsia.  ALLERGIES:  No history of asthma, hives, eczema or rhinitis. CONSTITUTIONAL: See HPI  HEENT:  Eyes:  No visual loss, blurred vision, double vision or yellow sclerae. Ears, Nose, Throat:  No hearing loss, sneezing, congestion, runny nose or sore throat.  SKIN:  No rash or itching.  CARDIOVASCULAR:  No chest pain, chest pressure or chest discomfort. No palpitations.  RESPIRATORY:  See HPI  GASTROINTESTINAL:  No anorexia, nausea, vomiting or diarrhea. No abdominal pain or blood.  GENITOURINARY:  Denies hematuria, dysuria.   NEUROLOGICAL:  No headache, dizziness, syncope, paralysis, ataxia, numbness or tingling in the extremities. No change in bowel or bladder control.  MUSCULOSKELETAL:  No muscle, back pain, joint pain or stiffness.  HEMATOLOGIC:  No bleeding or bruising.  LYMPHATICS:  No enlarged nodes.   PSYCHIATRIC:  No history of depression or anxiety.  ENDOCRINOLOGIC:  No reports of cold or heat intolerance. No polyuria or polydipsia.  ALLERGIES:  No history of asthma, hives, eczema or rhinitis. CONSTITUTIONAL: fevers+, sweating+  Eyes:  No visual loss, blurred vision, double vision or yellow sclerae.   Mouth: no cut or sores in mouth  SKIN:  No rash or itching.  CARDIOVASCULAR:  No chest pain, chest pressure or chest discomfort. No palpitations.  RESPIRATORY:  cough+, no sob at rest  GASTROINTESTINAL:  No anorexia, nausea, vomiting or diarrhea. No abdominal pain or blood.  GENITOURINARY:  Denies hematuria, dysuria.   NEUROLOGICAL:  No headache, dizziness, syncope, paralysis, ataxia, numbness or tingling in the extremities. No change in bowel or bladder control.  MUSCULOSKELETAL:  No muscle, back pain, joint pain or stiffness.  HEMATOLOGIC:  No bleeding or bruising.  LYMPHATICS:  No enlarged nodes.   PSYCHIATRIC:  No history of depression or anxiety.  ENDOCRINOLOGIC:  No reports of cold or heat intolerance. No polyuria or polydipsia.  ALLERGIES:  No history of asthma, hives, eczema or rhinitis.

## 2019-09-16 NOTE — PROGRESS NOTE ADULT - PROBLEM SELECTOR PLAN 2
Pt w/ transaminitis initially discovered during ED visit on 9/12. CT imaging reveals hepatomegaly. EBV serology sent in the ED.  - F/u EBV serology  - Acute hepatitis panel  -  HBsAb, HBsAg, HBcAb, HCV Ab, HAV IgG/IgM to r/o viral hepatitides   -  CINTHYA, ASMA, LKM Ab, IgG subsets, quant IgM, IgA, AMA to r/o autoimmune disease  -  alpha-1-antityrypsin, iron panel and ferritin, ceruloplasmin  - avoid all hepatotoxic agents  - Trend CMP, CBC, coags Pt w/ transaminitis initially discovered during ED visit on 9/12. CT imaging reveals hepatomegaly. EBV serology sent in the ED.  - Ordered RUQ ultrasound to further characterize hepatomegaly, will f/u  - F/u EBV serology  - Acute hepatitis panel  -  HBsAb, HBsAg, HBcAb, HCV Ab, HAV IgG/IgM to r/o viral hepatitides   -  CINTHYA, ASMA, LKM Ab, IgG subsets, quant IgM, IgA, AMA to r/o autoimmune disease  -  alpha-1-antityrypsin, iron panel and ferritin, ceruloplasmin  - avoid all hepatotoxic agents  - Trend CMP, CBC, coags

## 2019-09-16 NOTE — DISCHARGE NOTE PROVIDER - NSDCPNSUBOBJ_GEN_ALL_CORE
Agree with plan, DC today and f/u with Hepatology on Tuesday for LFT check.  Discharge time 38 minutes.

## 2019-09-16 NOTE — DISCHARGE NOTE PROVIDER - NSFOLLOWUPCLINICS_GEN_ALL_ED_FT
[facility]  Gastroenterology  [address] [place] [id number]  Phone: [phone]  Fax:   Follow Up Time:

## 2019-09-16 NOTE — CONSULT NOTE ADULT - ASSESSMENT
39 yo F w/ no PMH/PSH p/w fevers and cough x 1 week.   associated with fevers, bloody sputum  patient with recent travel to [place](may) and [place](august)  No prior cough,no weight loss/night sweats  Was on tylenol 6 tabs/day X 1 week  labs as above  No leucocytosis  Transaminitis  CT with RUL ? multifocal pna, hepatomegaly  s/p amox,cephlexin as OP x 3 days   stable hemodynamically  HIV negative   ? CAP  ? Aspiration  ? Atypical  ? TB-her symptoms <1 week make it lower on differential list-1 sputu AFB negative so far  Other travel related less likley as [place] travel in may   Also transaminitis ? medication induced(tylenol) ? NAFLD ? other etiology-hepatitis panel negative    Rec:  1) Check sputum cx  2) Follow blood Cx  3) check urine legionella  4) Follow hepatitis serologies  5) workup for NAFLD, other etiologies of hepatitis per primary  6) check sputum AFB X 3 airborne precautions till negative  7) Agree with empiric zosyn + zithromax for now  Will tailor plan for ID issues  per course,results.Will defer to primary team on management of other issues.  Case d/w primary team    Will Follow.  Beeper [id number]/afterhours/No response-[id number]

## 2019-09-16 NOTE — CONSULT NOTE ADULT - SUBJECTIVE AND OBJECTIVE BOX
Patient is a 38y old  Female who presents with a chief complaint of 38F w/ fever and productive cough (16 Sep 2019 13:38)    From HPI" HPI:  Pt is a 37 yo F w/ no PMH/PSH p/w fevers and cough x 1 week. Pt reported that she was in good health up until 1 week prior to this admission, when that evening she ordered take out, took a nap, and then woke up and felt feverish. Pt reported taking an oral temperature of 101 F. Pt reported that she took Tylenol that evening and became "sweaty" due to the fevers. Pt reported that she took notice of a cough about two days later which lead her to visit her PCP in the morning. Pt reported that her PCP prescribed her amoxicillin and was sent home. Pt reported that her cough and fevers continue to worsen prompting her to visit the ED that afternoon. Pt reported that they took an xray which was normal and found that she had a UTI. Pt reported that the ED providers switched her antibiotics to cephalexin. Pt addiontally reported that she had a RUQ ultrasound however was not sure why. Chart reviewed that patient had a transaminitis and RUQ showed sludge in the gallbladder. Pt reported that she continued to have fevers w/ a Tmax of 104.5 on 9/14. Pt reported that 2 days prior to admission, she developed some blood in the sputum. Pt reported that the sputum was occasionally streaked w/ blood.     Of note: Pt reported recent travel to [place], [place] for 20 days in May and a trip to [place] from Aug. 16th - 24th. Pt reported that he son had a fever on August 21st. Pt reported no CP, abd pain, diarrhea, or dysuria.  Sick contact in Mother who is having productive cough and fever and does not see physicians.    In the ED,   T(C): 36.3, Max: 37.8 T(F): 97.4, Max: 100 HR: 79 (79 - 98) BP: 94/57  (94/57 - 113/76) RR: 18 (16 - 20) SpO2: 96% (96% - 98%)  ALK Phos: 475 AST/ALT: 317/604 Uhcg: negative UA: Moderate LE    CT Chest, A/P: Right upper lobe pneumonia with small nodular opacities in the superior segment of the right lower lobe, likely reflecting multifocal infection. No acute intra-abdominal pathology. Hepatomegaly measuring 18.7 cm.    Pt treated w/ tylenol, vancomycin, azithromycin, ceftriazone, potassium chloride, 1L NS bolus x1. (16 Sep 2019 01:07)  "    Above verified-agree with above unless noted below.    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    REVIEW OF SYSTEMS  General:+malaise fatigue no chills. Fevers as above     Skin:No rash  	  Ophthalmologic:Denies any visual complaints,discharge redness or photophobia  	  ENMT:No nasal discharge,headache,sinus congestion or throat pain.No dental complaints    Respiratory and Thorax:+ cough + ,sputum no chest pain.Denies shortness of breath  	  Cardiovascular:	No chest pain,palpitaions or dizziness    Gastrointestinal:	NO nausea,abdominal pain or diarrhea.    Genitourinary:	No dysuria,frequency. No flank pain    Musculoskeletal:	No joint swelling or pain.No weakness    Neurological:No confusion,diziness.No extremity weakness.No bladder or bowel incontinence	    Psychiatric:No delusions or hallucinations	    Hematology/Lymphatics:	No LN swelling.No gum bleeding     Endocrine:	No recent weight gain or loss.No abnormal heat/cold intolerance    Allergic/Immunologic:	No hives or rash   Allergies    No Known Allergies    Intolerances        Antimicrobials:    azithromycin  IVPB 500 milliGRAM(s) IV Intermittent every 24 hours  piperacillin/tazobactam IVPB.. 3.375 Gram(s) IV Intermittent every 8 hours      Prior Antimicrobials:  MEDICATIONS  (STANDING):  azithromycin  IVPB   250 mL/Hr IV Intermittent (09-15-19 @ 22:00)    cefTRIAXone   IVPB   100 mL/Hr IV Intermittent (09-15-19 @ 23:20)    piperacillin/tazobactam IVPB.   200 mL/Hr IV Intermittent (09-16-19 @ 03:50)    piperacillin/tazobactam IVPB..   25 mL/Hr IV Intermittent (09-16-19 @ 13:24)    vancomycin  IVPB   250 mL/Hr IV Intermittent (09-15-19 @ 23:41)      Other medications reviewed      Vital Signs Last 24 Hrs  T(C): 38.4 (16 Sep 2019 13:27), Max: 38.4 (16 Sep 2019 13:27)  T(F): 101.2 (16 Sep 2019 13:27), Max: 101.2 (16 Sep 2019 13:27)  HR: 90 (16 Sep 2019 11:48) (79 - 98)  BP: 106/71 (16 Sep 2019 11:48) (94/57 - 113/76)  BP(mean): --  RR: 16 (16 Sep 2019 11:48) (16 - 20)  SpO2: 95% (16 Sep 2019 11:48) (95% - 98%)    PHYSICAL EXAM:Pleasant patient in no acute distress.      Constitutional:Comfortable.Awake and alert  No cachexia     Eyes:PERRL EOMI.NO discharge or conjunctival injection    ENMT:No sinus tenderness.No thrush.No pharyngeal exudate or erythema.Fair dental hygiene    Neck:Supple,No LN,no JVD      Respiratory:Good air entry bilaterally,CTA    Cardiovascular:S1 S2 wnl, No murmurs,rub or gallops    Gastrointestinal:Soft BS(+) obese  no tenderness no masses ,No rebound or guarding    Genitourinary:No CVA tendereness         Extremities:No cyanosis,clubbing or edema.    Vascular:peripheral pulses felt    Neurological:AAO X 3,No grossly focal deficits    Skin:No rash     Lymph Nodes:No palpable LNs    Musculoskeletal:No joint swelling or LOM    Psychiatric:Affect normal.          Labs:                            11.8   5.71  )-----------( 253      ( 16 Sep 2019 09:38 )             35.3       WBC Count: 5.71 (09-16-19 @ 09:38)  WBC Count: 5.9 (09-15-19 @ 18:15)  WBC Count: 8.0 (09-12-19 @ 01:27)      Auto Neutrophil #: 4.24 K/uL (09.16.19 @ 09:38)    Auto Eosinophil #: 0.06 K/uL (09.16.19 @ 09:38)      09-16    137  |  100  |  4<L>  ----------------------------<  106<H>  3.8   |  23  |  0.49<L>    Ca    8.9      16 Sep 2019 06:49  Phos  3.1     09-16  Mg     2.2     09-16    TPro  6.9  /  Alb  3.7  /  TBili  0.5  /  DBili  x   /  AST  336<H>  /  ALT  556<H>  /  AlkPhos  445<H>  09-16    Comprehensive Metabolic Panel (09.15.19 @ 18:15)    Alanine Aminotransferase (ALT/SGPT): 604 U/L    Comprehensive Metabolic Panel (09.12.19 @ 01:27)    Aspartate Aminotransferase (AST/SGOT): 363 U/L    Ferritin, Serum in AM (09.16.19 @ 09:33)    Ferritin, Serum: 903 ng/mL        Culture - Acid Fast - Sputum w/Smear . (09.16.19 @ 06:33)    Specimen Source: .Sputum    Acid Fast Bacilli Smear:   No acid fast bacilli seen by fluorochrome stain      Culture - Urine (collected 12 Sep 2019 04:51)  Source: .Urine  Final Report (13 Sep 2019 00:37):    No growth      Blood Cx testing      Hepatitis B Surface Antibody (09.16.19 @ 09:31)    Hepatitis B Surface Antibody: Reactive        Acute Hepatitis Panel (09.16.19 @ 09:31)    Hepatitis C Virus Interpretation: Nonreact: Hepatitis C AB  S/CO Ratio                        Interpretation  < 1.00                                   Non-Reactive  1.00 - 4.99                         Weakly-Reactive  >= 5.00                                Reactive  Non-Reactive: A person witha non-reactive HCV antibody result is  considered uninfected.  No further action is needed unless recent  infection is suspected.  In these cases, consider repeat testing later to  detect seroconversion..  Weakly-Reactive: HCV antibody test is abnormal, HCV RNA Qualitative test  will follow.  Reactive: HCV antibody test is abnormal, HCV RNA Qualitative test will  follow.  Note: HCV antibody testing is performed on the Abbott  system.    Hepatitis C Virus S/CO Ratio: 0.09 S/CO    Hepatitis B Core IgM Antibody: Nonreact    Hepatitis B Surface Antigen: Nonreact    Hepatitis A IgM Antibody: Nonreact        Clarita-Barr Virus Serologic Test (09.15.19 @ 21:57)    EBNA IgG EIA: 305.0 U/mL    EBV EA Ab EIA: <5.0 U/mL    EBV VCA IgG EIA: 93.0 U/mL    EBV VCA IgM EIA: <10.0 U/mL    EBV Interpretation: See Note: INTERPRETATION OF CLARITA BARR VIRUS (EBV) ANTIBODY RESULTS  EBV VCA IGG AB EBV NA IGG AB EBV VCA IGM AB EBV EA IGG AB Diagnosis  NEG NEG NEG NEG EBV Sero-negative  NEG NEG POS NEG Suspected primary infection (Early Phase)  POS NEG POS POS/NEG Past EBV infection ( Convalescence)  POS POS NEG POS/NEG Past EBV infection  POS POS POS/NEG POS Reactivated Infection      < from: CT Chest w/ IV Cont (09.15.19 @ 21:41) >    IMPRESSION:     CT chest: Right upper lobe pneumonia with small nodular opacities in the   superior segment of the right lower lobe, likely reflecting multifocal   infection. Follow-up chest CT in 6-8 weeks recommended to ensure   resolution.    CT abdomen and pelvis: No acute intra-abdominal pathology. Hepatomegaly   measuring 18.7 cm        < end of copied text >      Acetaminophen Level, Serum (09.16.19 @ 06:49)    Acetaminophen Level, Serum: <15 ug/mL    HIV-1/2 Antigen/Antibody Screen by [facility] (09.16.19 @ 09:23)    HIV-1/2 Combo Result: Nonreact: The HIV Ag/Ab Combo test performed screens for HIV-1 p24 antigen,  antibodies to HIV-1 (group M and group O), and antibodies to HIV-2. All  specimens repeatedly reactive will reflex to an HIV 1/2 antibody  confirmation and differentiation test. This assay detects p24 antigen  which may be present prior to the development of HIV antibodies,  therefore a reactive result with a negative HIV 1/2 AB Confirmation  should be followed up with HIV-1 RNA, HIV-2 RNA and repeat testing in 4-8  weeks. A nonreactive result does not preclude previous exposure to or  infection with HIV-1 or HIV-2. [facility] prohibits disclosure of this  result to any unauthorized party.

## 2019-09-16 NOTE — PROVIDER CONTACT NOTE (OTHER) - SITUATION
Pt temperature increasing
Patient stated she felt like her temperature is going up. Pt has no other complaints. Since 11:30 the temperature has elevated to 100.1

## 2019-09-16 NOTE — H&P ADULT - PROBLEM SELECTOR PLAN 3
DVT: HSQ  Diet: regular  Code: full  Dispo: Home Pt being ttreated for UTI. Currently on Day 4 of cefpodoxime.   - Abx as above  - Pt currently asymptomatic.

## 2019-09-16 NOTE — H&P ADULT - NSHPPHYSICALEXAM_GEN_ALL_CORE
T(C): 37.3 (09-16-19 @ 00:11), Max: 37.8 (09-15-19 @ 21:23)  HR: 88 (09-16-19 @ 00:11) (82 - 98)  BP: 104/68 (09-16-19 @ 00:11) (95/67 - 113/76)  RR: 20 (09-16-19 @ 00:11) (16 - 20)  SpO2: 96% (09-16-19 @ 00:11) (96% - 98%)    GENERAL APPEARANCE: Well developed, well nourished, alert and cooperative. NAD.   HEENT:  PERRL, EOMI. External auditory canals normal, hearing grossly intact.  NECK: Neck supple, non-tender without lymphadenopathy, masses or thyromegaly.  CARDIAC: Normal S1 and S2. No S3, S4 or murmurs. Rhythm is regular.  LUNGS: Clear to auscultation and percussion without rales, rhonchi, wheezing or diminished breath sounds.  ABDOMEN: Positive bowel sounds. Soft, nondistended, nontender. No guarding or rebound.   MUSCULOSKELETAL: ROM intact spine and extremities. No joint erythema or tenderness.   BACK: normal gait and posture, no spinal deformity, symmetry of spinal muscles, without tenderness, decreased range of motion.  EXTREMITIES: No significant deformity or joint abnormality. No edema. Peripheral pulses intact. No varicosities.  NEUROLOGICAL: CN II-XII intact. Strength and sensation symmetric and intact throughout.   SKIN: Skin normal color, texture and turgor with no lesions or eruptions.  PSYCHIATRIC: AOx3.Normal affect and behavior. T(C): 37.3 (09-16-19 @ 00:11), Max: 37.8 (09-15-19 @ 21:23)  HR: 88 (09-16-19 @ 00:11) (82 - 98)  BP: 104/68 (09-16-19 @ 00:11) (95/67 - 113/76)  RR: 20 (09-16-19 @ 00:11) (16 - 20)  SpO2: 96% (09-16-19 @ 00:11) (96% - 98%)    GENERAL APPEARANCE: Well developed, well nourished, alert and cooperative. NAD.   HEENT:  PERRL, EOMI. External auditory canals normal, hearing grossly intact.  NECK: Neck supple, non-tender without lymphadenopathy, masses or thyromegaly.  CARDIAC: Normal S1 and S2. No S3, S4 or murmurs. Rhythm is regular.  LUNGS: Bronchial breath sounds b/l   ABDOMEN: Positive bowel sounds. Soft, nondistended, nontender. No guarding or rebound.   EXTREMITIES: No significant deformity or joint abnormality. No edema. Peripheral pulses intact. No varicosities.  NEUROLOGICAL: CN II-XII intact. Strength and sensation symmetric and intact throughout.   SKIN: Skin normal color, texture and turgor with no lesions or eruptions.  PSYCHIATRIC: AOx3.Normal affect and behavior. T(C): 37.3 (09-16-19 @ 00:11), Max: 37.8 (09-15-19 @ 21:23)  HR: 88 (09-16-19 @ 00:11) (82 - 98)  BP: 104/68 (09-16-19 @ 00:11) (95/67 - 113/76)  RR: 20 (09-16-19 @ 00:11) (16 - 20)  SpO2: 96% (09-16-19 @ 00:11) (96% - 98%) on room air    GENERAL APPEARANCE: Well developed, well nourished, alert and cooperative. NAD.   HEENT:  PERRL, EOMI. External auditory canals normal, hearing grossly intact.  NECK: Neck supple, non-tender without lymphadenopathy, masses or thyromegaly.  CARDIAC: Normal S1 and S2. No S3, S4 or murmurs. Rhythm is regular.  LUNGS: Bronchial breath sounds b/l   ABDOMEN: Positive bowel sounds. Soft, nondistended, nontender. No guarding or rebound.   EXTREMITIES: No significant deformity or joint abnormality. No edema. Peripheral pulses intact. No varicosities.  NEUROLOGICAL: CN II-XII intact. Strength and sensation symmetric and intact throughout.   SKIN: Skin normal color, texture and turgor with no lesions or eruptions.  PSYCHIATRIC: AOx3.Normal affect and behavior.

## 2019-09-16 NOTE — H&P ADULT - HISTORY OF PRESENT ILLNESS
ALK Phos: 475 AST/ALT: 317/604 Uhcg: negative UA: Moderate LE    CT Chest, A/P: Right upper lobe pneumonia with small nodular opacities in the superior segment of the right lower lobe, likely reflecting multifocal infection. No acute intra-abdominal pathology. Hepatomegaly measuring 18.7 cm.      Pt treated w/ tylenol, vancomycin, azithromycin, ceftriazone, potassium chloride, 1L NS bolus x1. Pt is a 37 yo F w/ no PMH/PSH p/w fevers and cough x 1 week. Pt reported that she was in good health up until 1 week prior to this admission, when that evening she ordered take out, took a nap, and then woke up and felt feverish. Pt reported taking an oral temperature of 101 F. Pt reported that she took Tylenol         ALK Phos: 475 AST/ALT: 317/604 Uhcg: negative UA: Moderate LE    CT Chest, A/P: Right upper lobe pneumonia with small nodular opacities in the superior segment of the right lower lobe, likely reflecting multifocal infection. No acute intra-abdominal pathology. Hepatomegaly measuring 18.7 cm.      Pt treated w/ tylenol, vancomycin, azithromycin, ceftriazone, potassium chloride, 1L NS bolus x1. Pt is a 37 yo F w/ no PMH/PSH p/w fevers and cough x 1 week. Pt reported that she was in good health up until 1 week prior to this admission, when that evening she ordered take out, took a nap, and then woke up and felt feverish. Pt reported taking an oral temperature of 101 F. Pt reported that she took Tylenol 	that evening and became "sweaty" due to the fevers. Pt reported that she took notice of a cough about two days later which lead her to visit her PCP in the morning. Pt reported that her PCP prescribed her amoxicillin and was sent home. Pt reported that her cough and fevers continue to worsen prompting her to visit the ED that afternoon. Pt reported that they took an xray which was normal and found that she had a UTI. Pt reported that the ED providers switched her antibiotics to cephalexin. Pt addiontally reported that she had a RUQ ultrasound however was not sure why. Chart reviewed that patient had a transaminitis and RUQ showed sludge in the gallbladder. Pt reported that she continued to have fevers w/ a Tmax of 104.5 on 9/14. Pt reported that 2 days prior to admission, she developed some blood in the sputum. Pt reported that the sputum was occasionally streaked w/ blood.     Of note: Pt reported recent travel to [place], [place] for 20 days in May and a trip to [place] from Aug. 16th - 24th. Pt reported that he son had a fever on August 21st. Pt reported no CP, abd pain, diarrhea, or dysuria.     In the ED,   T(C): 36.3, Max: 37.8 T(F): 97.4, Max: 100 HR: 79 (79 - 98) BP: 94/57  (94/57 - 113/76) RR: 18 (16 - 20) SpO2: 96% (96% - 98%)  ALK Phos: 475 AST/ALT: 317/604 Uhcg: negative UA: Moderate LE    CT Chest, A/P: Right upper lobe pneumonia with small nodular opacities in the superior segment of the right lower lobe, likely reflecting multifocal infection. No acute intra-abdominal pathology. Hepatomegaly measuring 18.7 cm.    Pt treated w/ tylenol, vancomycin, azithromycin, ceftriazone, potassium chloride, 1L NS bolus x1. Pt is a 39 yo F w/ no PMH/PSH p/w fevers and cough x 1 week. Pt reported that she was in good health up until 1 week prior to this admission, when that evening she ordered take out, took a nap, and then woke up and felt feverish. Pt reported taking an oral temperature of 101 F. Pt reported that she took Tylenol that evening and became "sweaty" due to the fevers. Pt reported that she took notice of a cough about two days later which lead her to visit her PCP in the morning. Pt reported that her PCP prescribed her amoxicillin and was sent home. Pt reported that her cough and fevers continue to worsen prompting her to visit the ED that afternoon. Pt reported that they took an xray which was normal and found that she had a UTI. Pt reported that the ED providers switched her antibiotics to cephalexin. Pt addiontally reported that she had a RUQ ultrasound however was not sure why. Chart reviewed that patient had a transaminitis and RUQ showed sludge in the gallbladder. Pt reported that she continued to have fevers w/ a Tmax of 104.5 on 9/14. Pt reported that 2 days prior to admission, she developed some blood in the sputum. Pt reported that the sputum was occasionally streaked w/ blood.     Of note: Pt reported recent travel to [place], [place] for 20 days in May and a trip to [place] from Aug. 16th - 24th. Pt reported that he son had a fever on August 21st. Pt reported no CP, abd pain, diarrhea, or dysuria.  Sick contact in Mother who is having productive cough and fever and does not see physicians.    In the ED,   T(C): 36.3, Max: 37.8 T(F): 97.4, Max: 100 HR: 79 (79 - 98) BP: 94/57  (94/57 - 113/76) RR: 18 (16 - 20) SpO2: 96% (96% - 98%)  ALK Phos: 475 AST/ALT: 317/604 Uhcg: negative UA: Moderate LE    CT Chest, A/P: Right upper lobe pneumonia with small nodular opacities in the superior segment of the right lower lobe, likely reflecting multifocal infection. No acute intra-abdominal pathology. Hepatomegaly measuring 18.7 cm.    Pt treated w/ tylenol, vancomycin, azithromycin, ceftriazone, potassium chloride, 1L NS bolus x1.

## 2019-09-16 NOTE — DISCHARGE NOTE PROVIDER - NSDCCPCAREPLAN_GEN_ALL_CORE_FT
PRINCIPAL DISCHARGE DIAGNOSIS  Diagnosis: Pneumonia of upper lobe of lung  Assessment and Plan of Treatment: You came to the hospital because you had cough productive of blood-streaked sputum. We performed a chest x-ray and a chest CT scan, which showed a pneumonia in the top part of your right lung. You were treated with antibiotics and your symptoms improved during your hospitalization. We performed a sputum test to make sure you did not have tuberculosis, which was _____. Please see your primary care physician within 1 week of discharge for follow up. PRINCIPAL DISCHARGE DIAGNOSIS  Diagnosis: Pneumonia of upper lobe of lung  Assessment and Plan of Treatment: You came to the hospital because you had cough productive of blood-streaked sputum. We performed a chest x-ray and a chest CT scan, which showed a pneumonia in the top part of your right lung. You were treated with antibiotics and your symptoms improved during your hospitalization. We performed a sputum test to make sure you did not have tuberculosis, which was negative. Please see your primary care physician within 1 week of discharge for follow up. PRINCIPAL DISCHARGE DIAGNOSIS  Diagnosis: Pneumonia of upper lobe of lung  Assessment and Plan of Treatment: You came to the hospital because you had cough productive of blood-streaked sputum. We performed a chest x-ray and a chest CT scan, which showed a pneumonia in the top part of your right lung. You were treated with antibiotics and your symptoms improved during your hospitalization. We performed a sputum test to make sure you did not have tuberculosis, which was negative. You will need to take oral antibiotics for 3 more days to complete the course. Please  the antibiotics prescription from your pharmacy and take the tablets as prescribed. Please see your primary care physician within 1 week of discharge for follow up. You will need to have your liver function tests repeated in 2 weeks via blood draw. You should also have a chest x-ray in 4 weeks to make sure that your pneumonia has healed. PRINCIPAL DISCHARGE DIAGNOSIS  Diagnosis: Pneumonia of upper lobe of lung  Assessment and Plan of Treatment: You came to the hospital because you had cough productive of blood-streaked sputum. We performed a chest x-ray and a chest CT scan, which showed a pneumonia in the top part of your right lung. You were treated with antibiotics and your symptoms improved during your hospitalization. We performed a sputum test to make sure you did not have tuberculosis, which was negative. You will need to take oral antibiotics for 3 more days to complete the course. Please  the antibiotics prescription from your pharmacy and take the tablets as prescribed. Please see your primary care physician within 1 week of discharge for follow up. You will need to have your liver function tests repeated in 2 weeks via blood draw. You should also have a chest x-ray in 4 weeks to make sure that your pneumonia has healed.      SECONDARY DISCHARGE DIAGNOSES  Diagnosis: Transaminitis  Assessment and Plan of Treatment: Transaminitis  When you came to the hospital, we performed blood work, which showed that your liver function tests were too high. During the time you were in the hospital, we did an ultrasound of the liver, which was read as normal, an MRI scan (MRCP), which was _____, and blood tests for infectious causes of liver test abnormalities, which were all negative. You can make an appointment to see the hepatology doctor for further follow up. Your liver function tests should be repeated in 2 weeks to ensure that they are still continuing to decrease. PRINCIPAL DISCHARGE DIAGNOSIS  Diagnosis: Pneumonia of upper lobe of lung  Assessment and Plan of Treatment: You came to the hospital because you had cough productive of blood-streaked sputum. We performed a chest x-ray and a chest CT scan, which showed a pneumonia in the top part of your right lung. You were treated with antibiotics and your symptoms improved during your hospitalization. We performed a sputum test to make sure you did not have tuberculosis, which was negative. You will need to take oral antibiotics for 3 more days to complete the course. Please  the antibiotics prescription from your pharmacy and take the tablets as prescribed. Please see your primary care physician within 1 week of discharge for follow up. You will need to have your liver function tests repeated in 2 weeks via blood draw. You should also have a chest x-ray in 4 weeks to make sure that your pneumonia has healed.      SECONDARY DISCHARGE DIAGNOSES  Diagnosis: Transaminitis  Assessment and Plan of Treatment: Transaminitis  When you came to the hospital, we performed blood work, which showed that your liver function tests were too high. During the time you were in the hospital, we did an ultrasound of the liver, which was read as normal, an MRI scan (MRCP), which was also read as normal, and blood tests for infectious causes of liver test abnormalities, which were all negative. You can make an appointment to see the hepatology doctor for further follow up. Your liver function tests should be repeated in 2 weeks to ensure that they are still continuing to decrease.

## 2019-09-17 LAB
ALBUMIN SERPL ELPH-MCNC: 3.7 G/DL — SIGNIFICANT CHANGE UP (ref 3.3–5)
ALP SERPL-CCNC: 529 U/L — HIGH (ref 40–120)
ALT FLD-CCNC: 589 U/L — HIGH (ref 10–45)
ANION GAP SERPL CALC-SCNC: 14 MMOL/L — SIGNIFICANT CHANGE UP (ref 5–17)
AST SERPL-CCNC: 265 U/L — HIGH (ref 10–40)
BASOPHILS # BLD AUTO: 0.02 K/UL — SIGNIFICANT CHANGE UP (ref 0–0.2)
BASOPHILS NFR BLD AUTO: 0.3 % — SIGNIFICANT CHANGE UP (ref 0–2)
BILIRUB DIRECT SERPL-MCNC: 0.2 MG/DL — SIGNIFICANT CHANGE UP (ref 0–0.2)
BILIRUB INDIRECT FLD-MCNC: 0.2 MG/DL — SIGNIFICANT CHANGE UP (ref 0.2–1)
BILIRUB SERPL-MCNC: 0.4 MG/DL — SIGNIFICANT CHANGE UP (ref 0.2–1.2)
BUN SERPL-MCNC: 6 MG/DL — LOW (ref 7–23)
CALCIUM SERPL-MCNC: 9.4 MG/DL — SIGNIFICANT CHANGE UP (ref 8.4–10.5)
CHLORIDE SERPL-SCNC: 99 MMOL/L — SIGNIFICANT CHANGE UP (ref 96–108)
CHOLEST SERPL-MCNC: 160 MG/DL — SIGNIFICANT CHANGE UP (ref 10–199)
CO2 SERPL-SCNC: 23 MMOL/L — SIGNIFICANT CHANGE UP (ref 22–31)
CREAT SERPL-MCNC: 0.56 MG/DL — SIGNIFICANT CHANGE UP (ref 0.5–1.3)
CULTURE RESULTS: SIGNIFICANT CHANGE UP
EOSINOPHIL # BLD AUTO: 0.16 K/UL — SIGNIFICANT CHANGE UP (ref 0–0.5)
EOSINOPHIL NFR BLD AUTO: 2.6 % — SIGNIFICANT CHANGE UP (ref 0–6)
GLUCOSE SERPL-MCNC: 107 MG/DL — HIGH (ref 70–99)
GRAM STN FLD: SIGNIFICANT CHANGE UP
HAV IGG SER QL IA: REACTIVE
HAV IGM SER-ACNC: SIGNIFICANT CHANGE UP
HCT VFR BLD CALC: 39.3 % — SIGNIFICANT CHANGE UP (ref 34.5–45)
HDLC SERPL-MCNC: 33 MG/DL — LOW
HGB BLD-MCNC: 12.6 G/DL — SIGNIFICANT CHANGE UP (ref 11.5–15.5)
IGG SERPL-MCNC: 958 MG/DL — SIGNIFICANT CHANGE UP (ref 700–1600)
IGG1 SER-MCNC: 525 MG/DL — SIGNIFICANT CHANGE UP (ref 248–810)
IGG2 SER-MCNC: 294 MG/DL — SIGNIFICANT CHANGE UP (ref 130–555)
IGG3 SER-MCNC: 67 MG/DL — SIGNIFICANT CHANGE UP (ref 15–102)
IGG4 SER-MCNC: 5 MG/DL — SIGNIFICANT CHANGE UP (ref 2–96)
IMM GRANULOCYTES NFR BLD AUTO: 1 % — SIGNIFICANT CHANGE UP (ref 0–1.5)
LEGIONELLA AG UR QL: NEGATIVE — SIGNIFICANT CHANGE UP
LIPID PNL WITH DIRECT LDL SERPL: 87 MG/DL — SIGNIFICANT CHANGE UP
LKM AB SER-ACNC: <20.1 UNITS — SIGNIFICANT CHANGE UP (ref 0–20)
LYMPHOCYTES # BLD AUTO: 1.35 K/UL — SIGNIFICANT CHANGE UP (ref 1–3.3)
LYMPHOCYTES # BLD AUTO: 21.5 % — SIGNIFICANT CHANGE UP (ref 13–44)
MAGNESIUM SERPL-MCNC: 2.3 MG/DL — SIGNIFICANT CHANGE UP (ref 1.6–2.6)
MCHC RBC-ENTMCNC: 29.7 PG — SIGNIFICANT CHANGE UP (ref 27–34)
MCHC RBC-ENTMCNC: 32.1 GM/DL — SIGNIFICANT CHANGE UP (ref 32–36)
MCV RBC AUTO: 92.7 FL — SIGNIFICANT CHANGE UP (ref 80–100)
MITOCHONDRIA AB SER-ACNC: SIGNIFICANT CHANGE UP
MONOCYTES # BLD AUTO: 0.44 K/UL — SIGNIFICANT CHANGE UP (ref 0–0.9)
MONOCYTES NFR BLD AUTO: 7 % — SIGNIFICANT CHANGE UP (ref 2–14)
NEUTROPHILS # BLD AUTO: 4.24 K/UL — SIGNIFICANT CHANGE UP (ref 1.8–7.4)
NEUTROPHILS NFR BLD AUTO: 67.6 % — SIGNIFICANT CHANGE UP (ref 43–77)
NIGHT BLUE STAIN TISS: SIGNIFICANT CHANGE UP
NIGHT BLUE STAIN TISS: SIGNIFICANT CHANGE UP
PHOSPHATE SERPL-MCNC: 3.7 MG/DL — SIGNIFICANT CHANGE UP (ref 2.5–4.5)
PLATELET # BLD AUTO: 329 K/UL — SIGNIFICANT CHANGE UP (ref 150–400)
POTASSIUM SERPL-MCNC: 3.9 MMOL/L — SIGNIFICANT CHANGE UP (ref 3.5–5.3)
POTASSIUM SERPL-SCNC: 3.9 MMOL/L — SIGNIFICANT CHANGE UP (ref 3.5–5.3)
PROT SERPL-MCNC: 7.2 G/DL — SIGNIFICANT CHANGE UP (ref 6–8.3)
RBC # BLD: 4.24 M/UL — SIGNIFICANT CHANGE UP (ref 3.8–5.2)
RBC # FLD: 12.8 % — SIGNIFICANT CHANGE UP (ref 10.3–14.5)
SMOOTH MUSCLE AB SER-ACNC: SIGNIFICANT CHANGE UP
SODIUM SERPL-SCNC: 136 MMOL/L — SIGNIFICANT CHANGE UP (ref 135–145)
SPECIMEN SOURCE: SIGNIFICANT CHANGE UP
TOTAL CHOLESTEROL/HDL RATIO MEASUREMENT: 4.9 RATIO — SIGNIFICANT CHANGE UP (ref 3.3–7.1)
TRIGL SERPL-MCNC: 202 MG/DL — HIGH (ref 10–149)
WBC # BLD: 6.27 K/UL — SIGNIFICANT CHANGE UP (ref 3.8–10.5)
WBC # FLD AUTO: 6.27 K/UL — SIGNIFICANT CHANGE UP (ref 3.8–10.5)

## 2019-09-17 PROCEDURE — 99233 SBSQ HOSP IP/OBS HIGH 50: CPT | Mod: GC

## 2019-09-17 PROCEDURE — 99233 SBSQ HOSP IP/OBS HIGH 50: CPT

## 2019-09-17 PROCEDURE — 93975 VASCULAR STUDY: CPT | Mod: 26

## 2019-09-17 PROCEDURE — 76705 ECHO EXAM OF ABDOMEN: CPT | Mod: 26,RT

## 2019-09-17 RX ADMIN — PIPERACILLIN AND TAZOBACTAM 25 GRAM(S): 4; .5 INJECTION, POWDER, LYOPHILIZED, FOR SOLUTION INTRAVENOUS at 23:10

## 2019-09-17 RX ADMIN — PIPERACILLIN AND TAZOBACTAM 25 GRAM(S): 4; .5 INJECTION, POWDER, LYOPHILIZED, FOR SOLUTION INTRAVENOUS at 06:49

## 2019-09-17 RX ADMIN — PIPERACILLIN AND TAZOBACTAM 25 GRAM(S): 4; .5 INJECTION, POWDER, LYOPHILIZED, FOR SOLUTION INTRAVENOUS at 13:34

## 2019-09-17 RX ADMIN — HEPARIN SODIUM 5000 UNIT(S): 5000 INJECTION INTRAVENOUS; SUBCUTANEOUS at 13:34

## 2019-09-17 RX ADMIN — HEPARIN SODIUM 5000 UNIT(S): 5000 INJECTION INTRAVENOUS; SUBCUTANEOUS at 06:49

## 2019-09-17 NOTE — PROGRESS NOTE ADULT - ASSESSMENT
37 yo F w/ no PMH/PSH p/w fevers and cough x 1 week.   associated with fevers, bloody sputum  patient with recent travel to [place](may) and [place](august)  No prior cough,no weight loss/night sweats  Was on tylenol 6 tabs/day X 1 week  labs as above  No leucocytosis  Transaminitis  CT with RUL ? multifocal pna, hepatomegaly  s/p amox,cephlexin as OP x 3 days   stable hemodynamically  HIV negative   ? CAP  ? Aspiration  ? Atypical  ? TB-her symptoms <1 week make it lower on differential list-1 sputu AFB negative so far  Other travel related less likley as [place] travel in may   Also transaminitis ? medication induced(tylenol) ? NAFLD ? other etiology-hepatitis panel negative  ?Other primary hematologic process- HLH etc-though short history and elevated ferritin could be due to liver disease    Rec:  1) Check sputum cx  2) Follow blood Cx  3) check serum TG-if no response consider evaluation for other etiologies as above   4)  workup for NAFLD, other etiologies of hepatitis per primary  5) check sputum AFB X 3 airborne precautions till negative  6) Agree with empiric zosyn   Will tailor plan for ID issues  per course,results.Will defer to primary team on management of other issues.  Case d/w primary team    Will Follow.  Beeper [id number]-ozrh/afterhours/No response-[id number] 39 yo F w/ no PMH/PSH p/w fevers and cough x 1 week.   associated with fevers, bloody sputum  patient with recent travel to [place](may) and [place](august)  No prior cough,no weight loss/night sweats  Was on tylenol 6 tabs/day X 1 week  labs as above  No leucocytosis  Transaminitis  CT with RUL ? multifocal pna, hepatomegaly  s/p amox,cephlexin as OP x 3 days   stable hemodynamically  HIV negative   ? CAP  ? Aspiration  ? Atypical  ? TB-her symptoms <1 week make it lower on differential list-1 sputu AFB negative so far  Other travel related less likley as [place] travel in may   Also transaminitis ? medication induced(tylenol) ? NAFLD ? other etiology-hepatitis panel negative  ?Other primary hematologic process- HLH etc-though short history and elevated ferritin could be due to liver disease    Rec:  1) Check sputum cx  2) Follow blood Cx,check CMV PCR   3) check serum TG-if no response consider evaluation for other etiologies as above   4)  workup for NAFLD, other etiologies of hepatitis per primary  5) check sputum AFB X 3 airborne precautions till negative  6) Agree with empiric zosyn   Will tailor plan for ID issues  per course,results.Will defer to primary team on management of other issues.  Case d/w primary team    Will Follow.  Beeper [id number]-cxuu/afterhours/No response-[id number]

## 2019-09-17 NOTE — PROGRESS NOTE ADULT - PROBLEM SELECTOR PLAN 1
Pt p/w fevers and cough (not septic) found to have RUL PNA on CT imaging s/p failed outpt treatment with cephalexin. S/p Vanc/Azithro/CTX in the ED. With recent travel hx, hx of night sweats, and hemoptysis there is concern for TB infection. Klebsiella PNA may also cause hemoptysis  - isolation pre-cautions  - 3 AFBs sent, 1st = negative, results of 2nd and 3rd pending  - Continue zosyn (day 3/5), discontinue azithro given negative urine legionella  - BCx and UCx NGTD   - Appreciate ID recs- will follow

## 2019-09-17 NOTE — PROGRESS NOTE ADULT - PROBLEM SELECTOR PLAN 2
Pt w/ transaminitis initially discovered during ED visit on 9/12. CT imaging reveals hepatomegaly. EBV serology sent in the ED.  - Ordered RUQ ultrasound to further characterize hepatomegaly, will f/u  - Acute hepatitis panel- negative  -  CINTHYA, ASMA, LKM Ab, IgG subsets, quant IgM, IgA, AMA to r/o autoimmune disease  -  alpha-1-antityrypsin, ceruloplasmin- wnl, high ferritin  - avoid all hepatotoxic agents  - Trend CMP, CBC, coags

## 2019-09-17 NOTE — PROGRESS NOTE ADULT - SUBJECTIVE AND OBJECTIVE BOX
Patient is a 38y old  Female who presents with a chief complaint of 38F w/ fever and productive cough (17 Sep 2019 08:17)    Being followed by ID for pna, transaminitis     Interval history:still with cough and sputum   afebrile today   No other acute events      ROS:  No SOB,CP  No N/V/D  No abd pain  No urinary complaints  No HA  No joint or limb pain  No other complaints      Antimicrobials:    piperacillin/tazobactam IVPB.. 3.375 Gram(s) IV Intermittent every 8 hours    zithromax Dced   other medications reviewed    Vital Signs Last 24 Hrs  T(C): 36.9 (09-17-19 @ 05:51), Max: 38.4 (09-16-19 @ 13:27)  T(F): 98.5 (09-17-19 @ 05:51), Max: 101.2 (09-16-19 @ 13:27)  HR: 87 (09-17-19 @ 05:51) (85 - 90)  BP: 104/67 (09-17-19 @ 05:51) (103/68 - 106/71)  BP(mean): --  RR: 18 (09-17-19 @ 05:51) (16 - 18)  SpO2: 96% (09-17-19 @ 05:51) (95% - 96%)    Physical Exam:    Constitutional well preserved,comfortable,pleasant    HEENT PERRLA EOMI,No pallor or icterus    No oral exudate or erythema    Neck supple no JVD or LN    Chest Good AE,basal bronchial BS    CVS RRR S1 S2 WNl No murmur or rub or gallop    Abd obese  soft BS normal No tenderness no masses    Ext No cyanosis clubbing or edema    IV site no erythema tenderness or discharge    Joints no swelling or LOM    CNS AAO X 3 no focal    Lab Data:                          12.6   6.27  )-----------( 329      ( 17 Sep 2019 08:39 )             39.3   Auto Neutrophil #: 4.24 K/uL (09.17.19 @ 08:39)        09-17    136  |  99  |  6<L>  ----------------------------<  107<H>  3.9   |  23  |  0.56    Ca    9.4      17 Sep 2019 05:53  Phos  3.7     09-17  Mg     2.3     09-17    TPro  7.2  /  Alb  3.7  /  TBili  0.4  /  DBili  0.2  /  AST  265<H>  /  ALT  589<H>  /  AlkPhos  529<H>  09-17        Culture - Sputum (collected 16 Sep 2019 22:00)  Source: .Sputum  Gram Stain (17 Sep 2019 01:53):    Moderate polymorphonuclear leukocytes per low power field    Few Squamous epithelial cells per low power field    Moderate Gram Variable Coccobacilli seen per oil power field    Culture - Acid Fast - Sputum w/Smear . (09.16.19 @ 06:33)    Specimen Source: .Sputum    Acid Fast Bacilli Smear:   No acid fast bacilli seen by fluorochrome stain      Culture - Urine (collected 16 Sep 2019 00:04)  Source: .Urine  Final Report (16 Sep 2019 20:58):    No growth    Culture - Blood (collected 15 Sep 2019 23:33)  Source: .Blood  Preliminary Report (17 Sep 2019 01:01):    No growth to date.    Culture - Blood (collected 15 Sep 2019 23:33)  Source: .Blood  Preliminary Report (17 Sep 2019 01:01):    No growth to date.      < from: CT Chest w/ IV Cont (09.15.19 @ 21:41) >    IMPRESSION:     CT chest: Right upper lobe pneumonia with small nodular opacities in the   superior segment of the right lower lobe, likely reflecting multifocal   infection. Follow-up chest CT in 6-8 weeks recommended to ensure   resolution.    CT abdomen and pelvis: No acute intra-abdominal pathology. Hepatomegaly   measuring 18.7 cm                < end of copied text >      Legionella pneumophila Antigen, Urine (09.16.19 @ 08:19)    Legionella Antigen, Urine: Negative          HIV-1/2 Antigen/Antibody Screen by [facility] (09.16.19 @ 09:23)    HIV-1/2 Combo Result: Nonreact: The HIV Ag/Ab Combo test performed screens for HIV-1 p24 antigen,  antibodies to HIV-1 (group M and group O), and antibodies to HIV-2. All  specimens repeatedly reactive will reflex to an HIV 1/2 antibody  confirmation and differentiation test. This assay detects p24 antigen  which may be present prior to the development of HIV antibodies,  therefore a reactive result with a negative HIV 1/2 AB Confirmation  should be followed up with HIV-1 RNA, HIV-2 RNA and repeat testing in 4-8  weeks. A nonreactive result does not preclude previous exposure to or  infection with HIV-1 or HIV-2. [facility] prohibits disclosure of this  result to any unauthorized party.      Rapid Respiratory Viral Panel (09.15.19 @ 18:15)    Rapid RVP Result: NotDetec: This Respiratory Panel uses polymerase chain reaction (PCR) to detect for  adenovirus; coronavirus (HKU1, NL63, 229E, OC43); human metapneumovirus  (hMPV); human enterovirus/rhinovirus (Entero/RV); influenza A; influenza  A/H1; influenza A/H3; influenza A/H1-2009; influenza B; parainfluenza  viruses 1, 2, 3, 4; respiratory syncytial virus; Mycoplasma pneumoniae;  and Chlamydophila pneumoniae.                Acute Hepatitis Panel (09.16.19 @ 09:31)    Hepatitis C Virus Interpretation: Nonreact: Hepatitis C AB  S/CO Ratio                        Interpretation  < 1.00                                   Non-Reactive  1.00 - 4.99                         Weakly-Reactive  >= 5.00                                Reactive  Non-Reactive: A person witha non-reactive HCV antibody result is  considered uninfected.  No further action is needed unless recent  infection is suspected.  In these cases, consider repeat testing later to  detect seroconversion..  Weakly-Reactive: HCV antibody test is abnormal, HCV RNA Qualitative test  will follow.  Reactive: HCV antibody test is abnormal, HCV RNA Qualitative test will  follow.  Note: HCV antibody testing is performed on the Abbott  system.    Hepatitis C Virus S/CO Ratio: 0.09 S/CO    Hepatitis B Core IgM Antibody: Nonreact    Hepatitis B Surface Antigen: Nonreact    Hepatitis A IgM Antibody: Nonreact

## 2019-09-17 NOTE — PROGRESS NOTE ADULT - SUBJECTIVE AND OBJECTIVE BOX
HUMBERTO Trejo, PGY 1  Pager [phone]    Patient is a 38y old  Female who presents with a chief complaint of 38F w/ fever and productive cough (16 Sep 2019 01:07)    SUBJECTIVE / OVERNIGHT EVENTS: No acute events overnight. Patient seen and examined at bedside this AM, A&Ox3. On evaluation, patient states that her cough continues to improve, is still productive of blood-streaked sputum. Reports night sweats last night, denies subjective fevers/chills. Lingering left-sided headache, which improved s/p motrin yesterday. Patient denies CP, abdominal pain, nausea/vomiting/diarrhea.    MEDICATIONS  (STANDING):  azithromycin  IVPB 500 milliGRAM(s) IV Intermittent every 24 hours  heparin  Injectable 5000 Unit(s) SubCutaneous every 8 hours  influenza   Vaccine 0.5 milliLiter(s) IntraMuscular once  piperacillin/tazobactam IVPB.. 3.375 Gram(s) IV Intermittent every 8 hours    MEDICATIONS  (PRN):  ibuprofen  Tablet. 400 milliGRAM(s) Oral every 6 hours PRN Moderate Pain (4 - 6)    Vital Signs Last 24 Hrs  T(C): 36.9 (17 Sep 2019 05:51), Max: 38.4 (16 Sep 2019 13:27)  T(F): 98.5 (17 Sep 2019 05:51), Max: 101.2 (16 Sep 2019 13:27)  HR: 87 (17 Sep 2019 05:51) (85 - 90)  BP: 104/67 (17 Sep 2019 05:51) (103/68 - 106/71)  RR: 18 (17 Sep 2019 05:51) (16 - 18)  SpO2: 96% (17 Sep 2019 05:51) (95% - 96%)    PHYSICAL EXAM  GENERAL: NAD, well-developed  NEURO: A&Ox3, motor strength intact in 4/4 extremities, sensation intact  HEAD:  Atraumatic, Normocephalic  NECK: Supple, No JVD  CHEST/LUNG: diminished breath sounds RUL, other fields clear to auscultation  HEART: Regular rate and rhythm; No murmurs, rubs, or gallops  ABDOMEN: Soft, Nontender, Nondistended; Bowel sounds present, no masses.  EXTREMITIES:  2+ Peripheral Pulses, No clubbing, cyanosis, or edema  SKIN: Warm, dry, intact, no rashes or lesions  PSYCH: affect and behavior appropriate for setting    LABS:                        11.8   5.71  )-----------( 253      ( 16 Sep 2019 09:38 )             35.3         136  |  99  |  6<L>  ----------------------------<  107<H>  3.9   |  23  |  0.56    Ca    9.4      17 Sep 2019 05:53  Phos  3.7       Mg     2.3         TPro  7.2  /  Alb  3.7  /  TBili  0.4  /  DBili  0.2  /  AST  265<H>  /  ALT  589<H>  /  AlkPhos  529<H>          Urinalysis Basic - ( 15 Sep 2019 19:13 )    Color: Colorless / Appearance: Clear / S.004 / pH: x  Gluc: x / Ketone: Negative  / Bili: Negative / Urobili: Negative   Blood: x / Protein: Negative / Nitrite: Negative   Leuk Esterase: Moderate / RBC: 7 /hpf / WBC 3 /HPF   Sq Epi: x / Non Sq Epi: 1 /hpf / Bacteria: Negative    PT/INR - ( 16 Sep 2019 09:16 )   PT: 12.2 sec;   INR: 1.08 ratio       PTT - ( 16 Sep 2019 09:16 )  PTT:31.1 sec    Sputum culture- gram variable coccobacilli  BCx- NGTD  UCx- NGTD             Urine legionella- negative    Sputum AFB negative x1    < from: CT Chest w/ IV Cont (09.15.19 @ 21:41) >  IMPRESSION:     CT chest: Right upper lobe pneumonia with small nodular opacities in the   superior segment of the right lower lobe, likely reflecting multifocal   infection. Follow-up chest CT in 6-8 weeks recommended to ensure   resolution.    CT abdomen and pelvis: No acute intra-abdominal pathology. Hepatomegaly   measuring 18.7 cm    < from: Xray Chest 2 Views PA/Lat (09.15.19 @ 18:51) >    IMPRESSION:     New right upper lobe opacity compatible with pneumonia.

## 2019-09-17 NOTE — PROGRESS NOTE ADULT - ASSESSMENT
Pt is a 37 yo F w/ no PMH p/w fevers and progressively worsening cough a/w CAP c/b transaminitis and hepatomegaly concerning for TB vs community-acquired PNA.

## 2019-09-18 LAB
ALBUMIN SERPL ELPH-MCNC: 3.7 G/DL — SIGNIFICANT CHANGE UP (ref 3.3–5)
ALP SERPL-CCNC: 539 U/L — HIGH (ref 40–120)
ALT FLD-CCNC: 486 U/L — HIGH (ref 10–45)
ANA TITR SER: NEGATIVE — SIGNIFICANT CHANGE UP
ANION GAP SERPL CALC-SCNC: 16 MMOL/L — SIGNIFICANT CHANGE UP (ref 5–17)
AST SERPL-CCNC: 190 U/L — HIGH (ref 10–40)
BASOPHILS # BLD AUTO: 0.02 K/UL — SIGNIFICANT CHANGE UP (ref 0–0.2)
BASOPHILS NFR BLD AUTO: 0.3 % — SIGNIFICANT CHANGE UP (ref 0–2)
BILIRUB SERPL-MCNC: 0.4 MG/DL — SIGNIFICANT CHANGE UP (ref 0.2–1.2)
BUN SERPL-MCNC: 9 MG/DL — SIGNIFICANT CHANGE UP (ref 7–23)
CALCIUM SERPL-MCNC: 9.3 MG/DL — SIGNIFICANT CHANGE UP (ref 8.4–10.5)
CHLORIDE SERPL-SCNC: 97 MMOL/L — SIGNIFICANT CHANGE UP (ref 96–108)
CMV DNA CSF QL NAA+PROBE: SIGNIFICANT CHANGE UP
CMV DNA SPEC NAA+PROBE-LOG#: SIGNIFICANT CHANGE UP LOGIU/ML
CMV IGM FLD-ACNC: <8 AU/ML — SIGNIFICANT CHANGE UP
CMV IGM SERPL QL: NEGATIVE — SIGNIFICANT CHANGE UP
CO2 SERPL-SCNC: 24 MMOL/L — SIGNIFICANT CHANGE UP (ref 22–31)
CREAT SERPL-MCNC: 0.6 MG/DL — SIGNIFICANT CHANGE UP (ref 0.5–1.3)
CULTURE RESULTS: SIGNIFICANT CHANGE UP
EOSINOPHIL # BLD AUTO: 0.21 K/UL — SIGNIFICANT CHANGE UP (ref 0–0.5)
EOSINOPHIL NFR BLD AUTO: 3 % — SIGNIFICANT CHANGE UP (ref 0–6)
GLUCOSE SERPL-MCNC: 98 MG/DL — SIGNIFICANT CHANGE UP (ref 70–99)
HBV E AB SER-ACNC: NEGATIVE — SIGNIFICANT CHANGE UP
HBV E AG SER-ACNC: NEGATIVE — SIGNIFICANT CHANGE UP
HCT VFR BLD CALC: 37.7 % — SIGNIFICANT CHANGE UP (ref 34.5–45)
HEV AB FLD QL: POSITIVE
HGB BLD-MCNC: 12.4 G/DL — SIGNIFICANT CHANGE UP (ref 11.5–15.5)
IMM GRANULOCYTES NFR BLD AUTO: 1.4 % — SIGNIFICANT CHANGE UP (ref 0–1.5)
LDH SERPL L TO P-CCNC: 296 U/L — HIGH (ref 50–242)
LYMPHOCYTES # BLD AUTO: 1.38 K/UL — SIGNIFICANT CHANGE UP (ref 1–3.3)
LYMPHOCYTES # BLD AUTO: 19.4 % — SIGNIFICANT CHANGE UP (ref 13–44)
MAGNESIUM SERPL-MCNC: 2.5 MG/DL — SIGNIFICANT CHANGE UP (ref 1.6–2.6)
MCHC RBC-ENTMCNC: 29.7 PG — SIGNIFICANT CHANGE UP (ref 27–34)
MCHC RBC-ENTMCNC: 32.9 GM/DL — SIGNIFICANT CHANGE UP (ref 32–36)
MCV RBC AUTO: 90.4 FL — SIGNIFICANT CHANGE UP (ref 80–100)
MONOCYTES # BLD AUTO: 0.4 K/UL — SIGNIFICANT CHANGE UP (ref 0–0.9)
MONOCYTES NFR BLD AUTO: 5.6 % — SIGNIFICANT CHANGE UP (ref 2–14)
NEUTROPHILS # BLD AUTO: 4.99 K/UL — SIGNIFICANT CHANGE UP (ref 1.8–7.4)
NEUTROPHILS NFR BLD AUTO: 70.3 % — SIGNIFICANT CHANGE UP (ref 43–77)
PHOSPHATE SERPL-MCNC: 3.9 MG/DL — SIGNIFICANT CHANGE UP (ref 2.5–4.5)
PLATELET # BLD AUTO: 378 K/UL — SIGNIFICANT CHANGE UP (ref 150–400)
POTASSIUM SERPL-MCNC: 4.2 MMOL/L — SIGNIFICANT CHANGE UP (ref 3.5–5.3)
POTASSIUM SERPL-SCNC: 4.2 MMOL/L — SIGNIFICANT CHANGE UP (ref 3.5–5.3)
PROT SERPL-MCNC: 7.1 G/DL — SIGNIFICANT CHANGE UP (ref 6–8.3)
RBC # BLD: 4.17 M/UL — SIGNIFICANT CHANGE UP (ref 3.8–5.2)
RBC # FLD: 12.9 % — SIGNIFICANT CHANGE UP (ref 10.3–14.5)
SODIUM SERPL-SCNC: 137 MMOL/L — SIGNIFICANT CHANGE UP (ref 135–145)
SPECIMEN SOURCE: SIGNIFICANT CHANGE UP
WBC # BLD: 7.1 K/UL — SIGNIFICANT CHANGE UP (ref 3.8–10.5)
WBC # FLD AUTO: 7.1 K/UL — SIGNIFICANT CHANGE UP (ref 3.8–10.5)

## 2019-09-18 PROCEDURE — 99233 SBSQ HOSP IP/OBS HIGH 50: CPT | Mod: GC

## 2019-09-18 PROCEDURE — 99232 SBSQ HOSP IP/OBS MODERATE 35: CPT

## 2019-09-18 RX ADMIN — PIPERACILLIN AND TAZOBACTAM 25 GRAM(S): 4; .5 INJECTION, POWDER, LYOPHILIZED, FOR SOLUTION INTRAVENOUS at 21:46

## 2019-09-18 RX ADMIN — PIPERACILLIN AND TAZOBACTAM 25 GRAM(S): 4; .5 INJECTION, POWDER, LYOPHILIZED, FOR SOLUTION INTRAVENOUS at 06:35

## 2019-09-18 RX ADMIN — PIPERACILLIN AND TAZOBACTAM 25 GRAM(S): 4; .5 INJECTION, POWDER, LYOPHILIZED, FOR SOLUTION INTRAVENOUS at 13:53

## 2019-09-18 NOTE — PROGRESS NOTE ADULT - PROBLEM SELECTOR PLAN 2
Pt w/ transaminitis initially discovered during ED visit on 9/12. CT imaging reveals hepatomegaly. EBV serology sent in the ED, LFTs continuing to downtrend.  - RUQ US read as wnl  - Acute hepatitis panel- negative  -  CINTHYA, ASMA, LKM Ab, IgG subsets, quant IgM, IgA, AMA to r/o autoimmune disease  -  alpha-1-antityrypsin, ceruloplasmin- wnl, high ferritin  - avoid all hepatotoxic agents  - Trend CMP, CBC, coags

## 2019-09-18 NOTE — PROGRESS NOTE ADULT - SUBJECTIVE AND OBJECTIVE BOX
HUMBERTO Trejo, PGY 1  Pager [phone]    Patient is a 38y old  Female who presents with a chief complaint of 38F w/ fever and productive cough (16 Sep 2019 01:07)    SUBJECTIVE / OVERNIGHT EVENTS: No acute events overnight. Patient seen and examined at bedside this AM, A&Ox3. On evaluation, patient reports improvement of her cough. Still with small amount of blood in sputum. Denies fevers/chills/night sweats. Left-sided headache has also improved. Patient denies CP, abdominal pain, nausea/vomiting/diarrhea.    MEDICATIONS  (STANDING):  heparin  Injectable 5000 Unit(s) SubCutaneous every 8 hours  influenza   Vaccine 0.5 milliLiter(s) IntraMuscular once  piperacillin/tazobactam IVPB.. 3.375 Gram(s) IV Intermittent every 8 hours    MEDICATIONS  (PRN):  ibuprofen  Tablet. 400 milliGRAM(s) Oral every 6 hours PRN Moderate Pain (4 - 6)    Vital Signs Last 24 Hrs  T(C): 36.8 (18 Sep 2019 04:45), Max: 37.2 (17 Sep 2019 20:51)  T(F): 98.2 (18 Sep 2019 04:45), Max: 99 (17 Sep 2019 20:51)  HR: 72 (18 Sep 2019 04:45) (72 - 83)  BP: 98/63 (18 Sep 2019 04:45) (93/65 - 102/68)  RR: 18 (18 Sep 2019 04:45) (18 - 18)  SpO2: 97% (18 Sep 2019 04:45) (86% - 97%)    PHYSICAL EXAM  GENERAL: NAD, well-developed  NEURO: A&Ox3, motor strength intact in 4/4 extremities, sensation intact  HEAD:  Atraumatic, Normocephalic  CHEST/LUNG: CTABL, no rales, wheezes, rhonchi  HEART: Regular rate and rhythm; No murmurs, rubs, or gallops  ABDOMEN: Soft, Nontender, Nondistended; Bowel sounds present, no masses.  EXTREMITIES:  2+ Peripheral Pulses, No clubbing, cyanosis, or edema  SKIN: Warm, dry, intact, no rashes or lesions  PSYCH: affect and behavior appropriate for setting    LABS:                        12.6   6.27  )-----------( 329      ( 17 Sep 2019 08:39 )             39.3     09-18    137  |  97  |  9   ----------------------------<  98  4.2   |  24  |  0.60    Ca    9.3      18 Sep 2019 07:05  Phos  3.9     09-18  Mg     2.5     09-18    TPro  7.1  /  Alb  3.7  /  TBili  0.4  /  DBili  x   /  AST  190<H>  /  ALT  486<H>  /  AlkPhos  539<H>  09-18    PT/INR - ( 16 Sep 2019 09:16 )   PT: 12.2 sec;   INR: 1.08 ratio      PTT - ( 16 Sep 2019 09:16 )  PTT:31.1 sec      Culture Results:   Normal Respiratory Opal present (09-16 @ 22:00)  Culture Results:   Order received in error- Urine culture specimen processed was collected  from another account.  Previously reported as: No growth (09-16 @ 00:04)  Culture Results:   No growth to date. (09-15 @ 23:33)  Culture Results:   No growth to date. (09-15 @ 23:33)  Culture Results:   No growth (09-12 @ 04:51)    Sputum culture- gram variable coccobacilli  BCx- NGTD  UCx- NGTD             Urine legionella- negative    Sputum AFB negative x3    < from: CT Chest w/ IV Cont (09.15.19 @ 21:41) >  IMPRESSION:     CT chest: Right upper lobe pneumonia with small nodular opacities in the   superior segment of the right lower lobe, likely reflecting multifocal   infection. Follow-up chest CT in 6-8 weeks recommended to ensure   resolution.    CT abdomen and pelvis: No acute intra-abdominal pathology. Hepatomegaly   measuring 18.7 cm    < from: Xray Chest 2 Views PA/Lat (09.15.19 @ 18:51) >    IMPRESSION:     New right upper lobe opacity compatible with pneumonia.    < from: US Abdomen Upper Quadrant Right (09.17.19 @ 14:02) >  IMPRESSION:     Normal right upper quadrant abdominal ultrasound.    No duplex evidence of portal or hepatic venous thrombosis.

## 2019-09-18 NOTE — PROGRESS NOTE ADULT - SUBJECTIVE AND OBJECTIVE BOX
Patient is a 38y old  Female who presents with a chief complaint of 38F w/ fever and productive cough (18 Sep 2019 08:18)    Being followed by ID for pna, transaminitis    Interval history:feels better  cough decreased   No acute events      ROS:  No SOB,CP  No N/V/D./abd pain  No other complaints      Antimicrobials:    piperacillin/tazobactam IVPB.. 3.375 Gram(s) IV Intermittent every 8 hours    Other medications reviewed    Vital Signs Last 24 Hrs  T(C): 36.8 (09-18-19 @ 04:45), Max: 37.2 (09-17-19 @ 20:51)  T(F): 98.2 (09-18-19 @ 04:45), Max: 99 (09-17-19 @ 20:51)  HR: 72 (09-18-19 @ 04:45) (72 - 83)  BP: 98/63 (09-18-19 @ 04:45) (93/65 - 102/68)  BP(mean): --  RR: 18 (09-18-19 @ 04:45) (18 - 18)  SpO2: 97% (09-18-19 @ 04:45) (86% - 97%)    Physical Exam:        HEENT PERRLA EOMI    No oral exudate or erythema    Chest Good AE,CTA    CVS RRR S1 S2 WNl No murmur or rub or gallop    Abd obese soft BS normal No tenderness no masses    IV site no erythema tenderness or discharge    CNS AAO X 3 no focal    Lab Data:                          12.4   7.10  )-----------( 378      ( 18 Sep 2019 09:30 )             37.7   WBC Count: 7.10 (09-18-19 @ 09:30)  WBC Count: 6.27 (09-17-19 @ 08:39)  WBC Count: 5.71 (09-16-19 @ 09:38)  WBC Count: 5.9 (09-15-19 @ 18:15)  WBC Count: 8.0 (09-12-19 @ 01:27)      09-18    137  |  97  |  9   ----------------------------<  98  4.2   |  24  |  0.60    Ca    9.3      18 Sep 2019 07:05  Phos  3.9     09-18  Mg     2.5     09-18    TPro  7.1  /  Alb  3.7  /  TBili  0.4  /  DBili  x   /  AST  190<H>  /  ALT  486<H>  /  AlkPhos  539<H>  09-18        Culture - Acid Fast - Sputum w/Smear . (09.17.19 @ 01:16)    Specimen Source: .Sputum    Acid Fast Bacilli Smear:   No acid fast bacilli seen by fluorochrome stain      Culture - Sputum (collected 16 Sep 2019 22:00)  Source: .Sputum  Gram Stain (17 Sep 2019 01:53):  Culture - Acid Fast - Sputum w/Smear . (09.16.19 @ 17:10)    Specimen Source: .Sputum    Acid Fast Bacilli Smear:   No acid fast bacilli seen by fluorochrome stain      Culture - Acid Fast - Sputum w/Smear . (09.16.19 @ 06:33)    Specimen Source: .Sputum    Acid Fast Bacilli Smear:   No acid fast bacilli seen by fluorochrome stain        Moderate polymorphonuclear leukocytes per low power field    Few Squamous epithelial cells per low power field    Moderate Gram Variable Coccobacilli seen per oil power field  Preliminary Report (17 Sep 2019 21:50):    Normal Respiratory Opal present      Culture - Urine (collected 16 Sep 2019 00:04)  Source: .Urine  Final Report (17 Sep 2019 18:30):    Order received in error- Urine culture specimen processed was collected    from another account.    Previously reported as: No growth    Culture - Blood (collected 15 Sep 2019 23:33)  Source: .Blood  Preliminary Report (17 Sep 2019 01:01):    No growth to date.    Culture - Blood (collected 15 Sep 2019 23:33)  Source: .Blood  Preliminary Report (17 Sep 2019 01:01):    No growth to date.      < from: US Abdomen Upper Quadrant Right (09.17.19 @ 14:02) >  IMPRESSION:     Normal right upper quadrant abdominal ultrasound.    No duplex evidence of portal or hepatic venous thrombosis.          < end of copied text >

## 2019-09-18 NOTE — PROGRESS NOTE ADULT - ASSESSMENT
Pt is a 37 yo F w/ no PMH p/w fevers and progressively worsening cough, likely with CAP c/b transaminitis and hepatomegaly, sputum AFB now negative x 3.

## 2019-09-18 NOTE — PROGRESS NOTE ADULT - ASSESSMENT
37 yo F w/ no PMH/PSH p/w fevers and cough x 1 week.   associated with fevers, bloody sputum  patient with recent travel to [place](may) and [place](august)  No prior cough,no weight loss/night sweats  Was on tylenol 6 tabs/day X 1 week  labs as above  No leucocytosis  Transaminitis  CT with RUL ? multifocal pna, hepatomegaly  s/p amox,cephlexin as OP x 3 days   stable hemodynamically  HIV negative   Clinically improving  LFTS trending down  work up so far negative  AFB X 3 negative on smear-low clinical risk-off isolation  Rec:  1)follow sputum cx  2) Follow blood Cx,check CMV PCR   3)   workup for NAFLD, other etiologies of hepatitis per primary  4) Continue empiric zosyn for now but will attempt de-escalation depending on culture results    Will tailor plan for ID issues  per course,results.Will defer to primary team on management of other issues.  Case d/w primary team    Will Follow.  Beeper [id number]-fxpl/afterhours/No response-[id number]

## 2019-09-18 NOTE — PROGRESS NOTE ADULT - PROBLEM SELECTOR PLAN 1
Pt p/w fevers and cough (not septic) found to have RUL PNA on CT imaging s/p failed outpt treatment with cephalexin. S/p Vanc/Azithro/CTX in the ED.   - AFB negative x3, r/o TB, no isolation required  - Continue zosyn (day 4/5)  - BCx and UCx NGTD   - Appreciate ID recs- will follow

## 2019-09-19 LAB
ALBUMIN SERPL ELPH-MCNC: 3.7 G/DL — SIGNIFICANT CHANGE UP (ref 3.3–5)
ALP SERPL-CCNC: 553 U/L — HIGH (ref 40–120)
ALT FLD-CCNC: 552 U/L — HIGH (ref 10–45)
ANION GAP SERPL CALC-SCNC: 15 MMOL/L — SIGNIFICANT CHANGE UP (ref 5–17)
AST SERPL-CCNC: 275 U/L — HIGH (ref 10–40)
BASOPHILS # BLD AUTO: 0.02 K/UL — SIGNIFICANT CHANGE UP (ref 0–0.2)
BASOPHILS NFR BLD AUTO: 0.3 % — SIGNIFICANT CHANGE UP (ref 0–2)
BILIRUB SERPL-MCNC: 0.4 MG/DL — SIGNIFICANT CHANGE UP (ref 0.2–1.2)
BUN SERPL-MCNC: 10 MG/DL — SIGNIFICANT CHANGE UP (ref 7–23)
CALCIUM SERPL-MCNC: 9.5 MG/DL — SIGNIFICANT CHANGE UP (ref 8.4–10.5)
CHLORIDE SERPL-SCNC: 98 MMOL/L — SIGNIFICANT CHANGE UP (ref 96–108)
CO2 SERPL-SCNC: 23 MMOL/L — SIGNIFICANT CHANGE UP (ref 22–31)
CREAT SERPL-MCNC: 0.61 MG/DL — SIGNIFICANT CHANGE UP (ref 0.5–1.3)
EOSINOPHIL # BLD AUTO: 0.22 K/UL — SIGNIFICANT CHANGE UP (ref 0–0.5)
EOSINOPHIL NFR BLD AUTO: 3.3 % — SIGNIFICANT CHANGE UP (ref 0–6)
GLUCOSE SERPL-MCNC: 99 MG/DL — SIGNIFICANT CHANGE UP (ref 70–99)
HCT VFR BLD CALC: 38.6 % — SIGNIFICANT CHANGE UP (ref 34.5–45)
HGB BLD-MCNC: 12.5 G/DL — SIGNIFICANT CHANGE UP (ref 11.5–15.5)
IMM GRANULOCYTES NFR BLD AUTO: 2.2 % — HIGH (ref 0–1.5)
LYMPHOCYTES # BLD AUTO: 1.57 K/UL — SIGNIFICANT CHANGE UP (ref 1–3.3)
LYMPHOCYTES # BLD AUTO: 23.2 % — SIGNIFICANT CHANGE UP (ref 13–44)
MAGNESIUM SERPL-MCNC: 2.4 MG/DL — SIGNIFICANT CHANGE UP (ref 1.6–2.6)
MCHC RBC-ENTMCNC: 29.4 PG — SIGNIFICANT CHANGE UP (ref 27–34)
MCHC RBC-ENTMCNC: 32.4 GM/DL — SIGNIFICANT CHANGE UP (ref 32–36)
MCV RBC AUTO: 90.8 FL — SIGNIFICANT CHANGE UP (ref 80–100)
MONOCYTES # BLD AUTO: 0.47 K/UL — SIGNIFICANT CHANGE UP (ref 0–0.9)
MONOCYTES NFR BLD AUTO: 7 % — SIGNIFICANT CHANGE UP (ref 2–14)
NEUTROPHILS # BLD AUTO: 4.33 K/UL — SIGNIFICANT CHANGE UP (ref 1.8–7.4)
NEUTROPHILS NFR BLD AUTO: 64 % — SIGNIFICANT CHANGE UP (ref 43–77)
PHOSPHATE SERPL-MCNC: 4 MG/DL — SIGNIFICANT CHANGE UP (ref 2.5–4.5)
PLATELET # BLD AUTO: 425 K/UL — HIGH (ref 150–400)
POTASSIUM SERPL-MCNC: 4.3 MMOL/L — SIGNIFICANT CHANGE UP (ref 3.5–5.3)
POTASSIUM SERPL-SCNC: 4.3 MMOL/L — SIGNIFICANT CHANGE UP (ref 3.5–5.3)
PROT SERPL-MCNC: 7.3 G/DL — SIGNIFICANT CHANGE UP (ref 6–8.3)
RBC # BLD: 4.25 M/UL — SIGNIFICANT CHANGE UP (ref 3.8–5.2)
RBC # FLD: 12.9 % — SIGNIFICANT CHANGE UP (ref 10.3–14.5)
SODIUM SERPL-SCNC: 136 MMOL/L — SIGNIFICANT CHANGE UP (ref 135–145)
WBC # BLD: 6.76 K/UL — SIGNIFICANT CHANGE UP (ref 3.8–10.5)
WBC # FLD AUTO: 6.76 K/UL — SIGNIFICANT CHANGE UP (ref 3.8–10.5)

## 2019-09-19 PROCEDURE — 99233 SBSQ HOSP IP/OBS HIGH 50: CPT | Mod: GC

## 2019-09-19 PROCEDURE — 99232 SBSQ HOSP IP/OBS MODERATE 35: CPT

## 2019-09-19 RX ADMIN — PIPERACILLIN AND TAZOBACTAM 25 GRAM(S): 4; .5 INJECTION, POWDER, LYOPHILIZED, FOR SOLUTION INTRAVENOUS at 05:09

## 2019-09-19 NOTE — CONSULT NOTE ADULT - ASSESSMENT
38F w/o PMHx, admitted for community acquired PNA, now finishing up abx treatment, incidentally found to have elevated liver enzymes in predominantly hepatocellular injury pattern, predating pt’s admission and medications, but after onset of pt’s infection.     Impression:  #Elevated liver enzymes – predominantly hepatocellular injury pattern given normal bili; d/dx includes infectious (although mostly ruled out), SHEPPARD, drug induced (most likely ibuprofen), less likely infiltrative dz  #Community acquired PNA – improving on abx    Recommendations:  - complete infectious work-up by sending HSV PCR, Hepatitis B core Ab total, HBV DNA  - f/u HEV IgM (in lab), would send Hepatitis E DNA level as well (requires downtime form from lab)  - MRCP/MRI Abd w/ contrast  - hold all non-essential medications  - trend plts, CMP, INR daily  - pending completion of work-up and findings on MRI Abd, pt may require liver biopsy to elucidate etiology of her liver injury, though pt probably doesn’t need to remain inpatient for this (discussed w/ patient)    Claude Nunez  Gastroenterology Fellow  Pager# [phone] or [phone]  Page on-call GI fellow after 5pm or on weekends

## 2019-09-19 NOTE — PROGRESS NOTE ADULT - ASSESSMENT
39 yo F w/ no PMH/PSH p/w fevers and cough x 1 week.   associated with fevers, bloody sputum  patient with recent travel to [place](may) and [place](august)  No prior cough,no weight loss/night sweats  Was on tylenol 6 tabs/day X 1 week  labs as above  No leucocytosis  Transaminitis  CT with RUL ? multifocal pna, hepatomegaly  s/p amox,cephlexin as OP x 3 days   stable hemodynamically  HIV negative   Clinically improving  LFTs still high  sputum cx normal [place]  Can change coverage to po levaquin  transaminitis per primary team ? hepatology eval  Will tailor plan for ID issues  per course,results.Will defer to primary team on management of other issues.  case d/w Dr elam  Will Follow.  Beeper [id number]-jxif/afterhours/No response-[id number]

## 2019-09-19 NOTE — CONSULT NOTE ADULT - SUBJECTIVE AND OBJECTIVE BOX
Chief Complaint:  Patient is a 38y old  Female who presents with a chief complaint of 38F w/ fever and productive cough (19 Sep 2019 13:24)    HPI:LOIS BELLO is a 38y Female w/o PMHx, initially presenting on 9/15 w/ fever, cough, and hemoptysis x 1 week. She initially presented to ED on 9/12 c/o fever, and was noted to have elevated liver enzymes at that time. She was also noted to have positive UA, and was given cephalexin. She was taking Tylenol and ibuprofen frequently for her sx, but reports never taking more than 2 tablets in a day.      She was discharged home w/ dx of viral illness, and returned on 9/15 w/ persistent sx. She was found to have RUL consolidation on CT Chest and has been treated for community acquired PNA with abx (pip-tazo) since then. She was ruled out for TB w/ 3 sputums neg for AFB. Hepatology consulted for elevated transaminases and hepatomegaly.     Pt’s liver enzyme trend:  9/12: AST//329; AlkP 162; TBili 0.7  9/15: AST//604; AlkP 475; TBili 0.5  9/16: AST//556; AlkP 445; TBili 0.5  9/17: AST//589; AlkP 529; TBili 0.4  9/18: AST//486; AlkP 539; TBili 0.4  9/19: AST//552; AlkP 553; TBili 0.4    Plts have been 200-400 range, and INR was normal on 9/16.     The primary team has already sent of significant work-up. CT A/P showed hepatomegaly w/ R hepatic lobe cyst and sub-cm hypodense lesions too small to characterize and no e/o portal hypertension. RUQ U/S showed normal liver morphology, normal ducts, normal GB, and normal vasculature.     Labs are notably negative for acute HAV, HBV (pt is immune), EBV, CMV. HCV Ab neg. HEV IgG is positive, but IgM is pending. In terms of autoimmune markers, IgG wnl, CINTHYA, AMA, Anti smooth muscle Ab, anti LK Ab all negative. Ceruloplasmin and alpha 1 AT are wnl. Ferritin is elevated in 900s, but TSat is 11%. Tylenol level wnl.     On my interview, pt denies any prior hx of liver dz, although she may have been told about some liver issue at birth but she does not recall. She will check with her mother. She has had 2 children in the [place] and has prenatal care and was never told about any liver dz. Her primary care MD reports she is negative for HBV, and her prior liver enzymes (checked 2016) were unremarkable.     Prior to this current illness, pt reports only taking occasional vitamin C and probiotic powders, and occasional grapeseed oil. She rarely drinks any herbal teas. She denies abd pain, n/v, diarrhea, jaundice, weight loss, f/c, skin rashes, anorexia, or confusion.    Pt’s BMI is 26 and she denies being significantly heavier. Her glucose has been 90s-low 100s during this admission and she denies any dx of DMII. Her lipids are mildly elevated for her age, but not significantly so. She reports drinking > 5 drinks with her friends once every 1-2 weeks, but has never been a daily drinker. She has had a TTE sometime prior to 2016 for SOB that was reportedly normal.      PMHX/PSHX:  No pertinent past medical history  No significant past surgical history    Allergies:  No Known Allergies    Home Medications: reviewed    Hospital Medications:  heparin  Injectable 5000 Unit(s) SubCutaneous every 8 hours  ibuprofen  Tablet. 400 milliGRAM(s) Oral every 6 hours PRN  influenza   Vaccine 0.5 milliLiter(s) IntraMuscular once  levoFLOXacin  Tablet 750 milliGRAM(s) Oral once  levoFLOXacin  Tablet 750 milliGRAM(s) Oral every 24 hours    Social History:   Tob: Denies  EtOH: As above  Illicit Drugs: Denies    Family history:  No pertinent family history in first degree relatives  Denies family history of colon cancer/polyps, stomach cancer/polyps, pancreatic cancer/masses, liver cancer/disease, ovarian cancer and endometrial cancer.    ROS:   General:  No wt loss, fevers, chills, night sweats, fatigue  Eyes:  Good vision, no reported pain  ENT:  No  pain, runny nose, dysphagia  CV:  No pain, palpitations, hypo/hypertension  Pulm:  No dyspnea, tachypnea, wheezing  GI:  See HPI, otherwise negative  :  No pain, bleeding, incontinence, nocturia  Muscle:  No pain, weakness  Neuro:  No weakness, tingling, memory problems  Psych:  No fatigue, insomnia, mood problems, depression  Endocrine:  No polyuria, polydipsia, cold/heat intolerance  Heme:  No petechiae, ecchymosis, easy bruisability  Skin:  No rash, tattoos, scars, edema    PHYSICAL EXAM:   Vital Signs:  Vital Signs Last 24 Hrs  T(C): 36.6 (19 Sep 2019 04:00), Max: 37.1 (18 Sep 2019 20:36)  T(F): 97.8 (19 Sep 2019 04:00), Max: 98.8 (18 Sep 2019 20:36)  HR: 68 (19 Sep 2019 04:00) (68 - 70)  BP: 101/68 (19 Sep 2019 04:00) (101/68 - 102/64)  BP(mean): --  RR: 18 (19 Sep 2019 04:00) (18 - 18)  SpO2: 96% (19 Sep 2019 04:00) (96% - 97%)  Daily     Daily     GENERAL: no acute distress, coughing frequently  NEURO: alert and oriented x 3, no asterixis  HEENT: anicteric sclera, no asterixis, no conjunctival pallor appreciated  CHEST: no respiratory distress, no accessory muscle use, clear to auscultation bilaterally  CARDIAC: regular rate, rhythm, no mrg appreciated  ABDOMEN: soft, non-tender, non-distended, no rebound or guarding  EXTREMITIES: warm, well perfused, no edema  SKIN: no lesions noted    LABS: reviewed                        12.5   6.76  )-----------( 425      ( 19 Sep 2019 08:36 )             38.6     09-19    136  |  98  |  10  ----------------------------<  99  4.3   |  23  |  0.61    Ca    9.5      19 Sep 2019 05:42  Phos  4.0     09-19  Mg     2.4     09-19    TPro  7.3  /  Alb  3.7  /  TBili  0.4  /  DBili  x   /  AST  275<H>  /  ALT  552<H>  /  AlkPhos  553<H>  09-19    LIVER FUNCTIONS - ( 19 Sep 2019 05:42 )  Alb: 3.7 g/dL / Pro: 7.3 g/dL / ALK PHOS: 553 U/L / ALT: 552 U/L / AST: 275 U/L / GGT: x           Culture - Acid Fast - Sputum w/Smear (collected 17 Sep 2019 01:16)  Source: .Sputum  Preliminary Report (18 Sep 2019 15:04):    Culture is being performed.    Culture - Sputum (collected 16 Sep 2019 22:00)  Source: .Sputum  Gram Stain (17 Sep 2019 01:53):    Moderate polymorphonuclear leukocytes per low power field    Few Squamous epithelial cells per low power field    Moderate Gram Variable Coccobacilli seen per oil power field  Final Report (18 Sep 2019 19:53):    Normal Respiratory Opal present    Culture - Acid Fast - Sputum w/Smear (collected 16 Sep 2019 17:10)  Source: .Sputum  Preliminary Report (18 Sep 2019 15:04):    Culture is being performed.    Diagnostic Studies: see sunrise for full report

## 2019-09-19 NOTE — PROGRESS NOTE ADULT - SUBJECTIVE AND OBJECTIVE BOX
HUMBERTO Trejo, PGY 1  Pager [phone]    Patient is a 38y old  Female who presents with a chief complaint of 38F w/ fever and productive cough (16 Sep 2019 01:07)    SUBJECTIVE / OVERNIGHT EVENTS: No acute events overnight. Patient seen and examined at bedside this AM, A&Ox3. On evaluation, patient reports improvement of her cough. Denies blood in sputum. Patient states she has a mild headache, believes this is 2/2 to poor sleep last night. Denies fevers/chills/night sweats, CP, SOB, abdominal pain, nausea/vomiting/diarrhea.    MEDICATIONS  (STANDING):  heparin  Injectable 5000 Unit(s) SubCutaneous every 8 hours  influenza   Vaccine 0.5 milliLiter(s) IntraMuscular once  piperacillin/tazobactam IVPB.. 3.375 Gram(s) IV Intermittent every 8 hours    MEDICATIONS  (PRN):  ibuprofen  Tablet. 400 milliGRAM(s) Oral every 6 hours PRN Moderate Pain (4 - 6)    Vital Signs Last 24 Hrs  T(C): 36.6 (19 Sep 2019 04:00), Max: 37.1 (18 Sep 2019 20:36)  T(F): 97.8 (19 Sep 2019 04:00), Max: 98.8 (18 Sep 2019 20:36)  HR: 68 (19 Sep 2019 04:00) (68 - 83)  BP: 101/68 (19 Sep 2019 04:00) (101/68 - 109/75)  RR: 18 (19 Sep 2019 04:00) (18 - 18)  SpO2: 96% (19 Sep 2019 04:00) (96% - 98%)    PHYSICAL EXAM  GENERAL: NAD, well-developed  NEURO: A&Ox3, motor strength intact in 4/4 extremities, sensation intact  HEAD:  Atraumatic, Normocephalic  CHEST/LUNG: CTABL, no rales, wheezes, rhonchi  HEART: Regular rate and rhythm; No murmurs, rubs, or gallops  ABDOMEN: Soft, Nontender, Nondistended; Bowel sounds present, no masses.  EXTREMITIES:  2+ Peripheral Pulses, No clubbing, cyanosis, or edema  SKIN: Warm, dry, intact, no rashes or lesions  PSYCH: affect and behavior appropriate for setting    LABS:                        12.4   7.10  )-----------( 378      ( 18 Sep 2019 09:30 )             37.7     09-19    136  |  98  |  10  ----------------------------<  99  4.3   |  23  |  0.61    Ca    9.5      19 Sep 2019 05:42  Phos  4.0     09-19  Mg     2.4     09-19    TPro  7.3  /  Alb  3.7  /  TBili  0.4  /  DBili  x   /  AST  275<H>  /  ALT  552<H>  /  AlkPhos  553<H>  09-19        Culture Results:   Culture is being performed. (09-17 @ 01:16)  Culture Results:   Normal Respiratory Opal present (09-16 @ 22:00)  Culture Results:   Culture is being performed. (09-16 @ 17:10)  Culture Results:   Culture is being performed. (09-16 @ 06:33)  Culture Results:   Order received in error- Urine culture specimen processed was collected  from another account.  Previously reported as: No growth (09-16 @ 00:04)  Culture Results:   No growth to date. (09-15 @ 23:33)  Culture Results:   No growth to date. (09-15 @ 23:33)  Culture Results:   No growth (09-12 @ 04:51)    Sputum culture- gram variable coccobacilli  BCx- NGTD  UCx- NGTD             Urine legionella- negative    Sputum AFB negative x3    < from: CT Chest w/ IV Cont (09.15.19 @ 21:41) >  IMPRESSION:     CT chest: Right upper lobe pneumonia with small nodular opacities in the   superior segment of the right lower lobe, likely reflecting multifocal   infection. Follow-up chest CT in 6-8 weeks recommended to ensure   resolution.    CT abdomen and pelvis: No acute intra-abdominal pathology. Hepatomegaly   measuring 18.7 cm    < from: Xray Chest 2 Views PA/Lat (09.15.19 @ 18:51) >    IMPRESSION:     New right upper lobe opacity compatible with pneumonia.    < from: US Abdomen Upper Quadrant Right (09.17.19 @ 14:02) >  IMPRESSION:     Normal right upper quadrant abdominal ultrasound.    No duplex evidence of portal or hepatic venous thrombosis.

## 2019-09-19 NOTE — PROGRESS NOTE ADULT - PROBLEM SELECTOR PLAN 2
Pt w/ transaminitis initially discovered during ED visit on 9/12. CT imaging reveals hepatomegaly. EBV serology sent in the ED, LFTs overall downtrending with slight uptrend today.  - RUQ US read as wnl  - Acute hepatitis panel- negative  -  CINTHYA, ASMA, LKM Ab, IgG subsets, quant IgM, IgA, AMA to r/o autoimmune disease  -  alpha-1-antityrypsin, ceruloplasmin- wnl, high ferritin  - avoid all hepatotoxic agents  - Trend CMP, CBC, coags  - Repeat LFTs as outpatient in 10-14 days

## 2019-09-19 NOTE — PROGRESS NOTE ADULT - PROBLEM SELECTOR PLAN 1
Pt p/w fevers and cough (not septic) found to have RUL PNA on CT imaging s/p failed outpt treatment with cephalexin. S/p Vanc/Azithro/CTX in the ED.   - AFB negative x3, r/o TB, no isolation required  - Appreciate ID recs- will switch from zosyn to PO antibiotics today, will complete 3 more days to finish course  - BCx and UCx NGTD

## 2019-09-19 NOTE — PROGRESS NOTE ADULT - SUBJECTIVE AND OBJECTIVE BOX
Patient is a 38y old  Female who presents with a chief complaint of 38F w/ fever and productive cough (19 Sep 2019 07:50)    Being followed by ID for pna, hepatitis     Interval history:feels much better   No acute events      ROS:  No cough,SOB,CP  No N/V/D./abd pain  No other complaints      Antimicrobials:    levoFLOXacin  Tablet 750 milliGRAM(s) Oral once  levoFLOXacin  Tablet 750 milliGRAM(s) Oral every 24 hours    Other medications reviewed    Vital Signs Last 24 Hrs  T(C): 36.6 (09-19-19 @ 04:00), Max: 37.1 (09-18-19 @ 20:36)  T(F): 97.8 (09-19-19 @ 04:00), Max: 98.8 (09-18-19 @ 20:36)  HR: 68 (09-19-19 @ 04:00) (68 - 70)  BP: 101/68 (09-19-19 @ 04:00) (101/68 - 102/64)  BP(mean): --  RR: 18 (09-19-19 @ 04:00) (18 - 18)  SpO2: 96% (09-19-19 @ 04:00) (96% - 97%)    Physical Exam:        HEENT PERRLA EOMI    No oral exudate or erythema    Chest Good AE,CTA    CVS RRR S1 S2 WNl No murmur or rub or gallop    Abd soft BS normal No tenderness no masses    IV site no erythema tenderness or discharge    CNS AAO X 3 no focal    Lab Data:                          12.5   6.76  )-----------( 425      ( 19 Sep 2019 08:36 )             38.6       09-19    136  |  98  |  10  ----------------------------<  99  4.3   |  23  |  0.61    Ca    9.5      19 Sep 2019 05:42  Phos  4.0     09-19  Mg     2.4     09-19    TPro  7.3  /  Alb  3.7  /  TBili  0.4  /  DBili  x   /  AST  275<H>  /  ALT  552<H>  /  AlkPhos  553<H>  09-19        Culture - Acid Fast - Sputum w/Smear . (09.17.19 @ 01:16)    Specimen Source: .Sputum    Acid Fast Bacilli Smear:   No acid fast bacilli seen by fluorochrome stain    Culture Results:   Culture is being performed.    Culture - Acid Fast - Sputum w/Smear . (09.16.19 @ 17:10)    Specimen Source: .Sputum    Acid Fast Bacilli Smear:   No acid fast bacilli seen by fluorochrome stain    Culture Results:   Culture is being performed.      Culture - Sputum (collected 16 Sep 2019 22:00)  Source: .Sputum  Gram Stain (17 Sep 2019 01:53):    Moderate polymorphonuclear leukocytes per low power field    Few Squamous epithelial cells per low power field  Culture - Acid Fast - Sputum w/Smear . (09.16.19 @ 06:33)    Specimen Source: .Sputum    Acid Fast Bacilli Smear:   No acid fast bacilli seen by fluorochrome stain    Culture Results:   Culture is being performed.      Moderate Gram Variable Coccobacilli seen per oil power field  Final Report (18 Sep 2019 19:53):    Normal Respiratory Opal present    Culture - Urine (collected 16 Sep 2019 00:04)  Source: .Urine  Final Report (17 Sep 2019 18:30):    Order received in error- Urine culture specimen processed was collected    from another account.    Previously reported as: No growth    Culture - Blood (collected 15 Sep 2019 23:33)  Source: .Blood  Preliminary Report (17 Sep 2019 01:01):    No growth to date.    Culture - Blood (collected 15 Sep 2019 23:33)  Source: .Blood  Preliminary Report (17 Sep 2019 01:01):    No growth to date.      < from: US Abdomen Upper Quadrant Right (09.17.19 @ 14:02) >  IMPRESSION:     Normal right upper quadrant abdominal ultrasound.    No duplex evidence of portal or hepatic venous thrombosis.        < end of copied text >

## 2019-09-19 NOTE — CONSULT NOTE ADULT - ATTENDING COMMENTS
Patient with new pneumonia and has child in .  Had normal liver tests in 2016 and now with elevated aminotransferase values to 500.  Labs were abnormal before antibiotics started.  Patient does take multiple health supplements.  No known prior liver disease.  Exam of liver normal.  Etiology of liver abnormality unclear and may relate to current active infection or possibly the supplements taken.  Suggest MRI evaluation and then trend liver abnormality.  Would not recommend biopsy yet , but assess course of liver tests. Patient well compensated.

## 2019-09-19 NOTE — PROGRESS NOTE ADULT - ASSESSMENT
Pt is a 39 yo F w/ no PMH p/w fevers and progressively worsening cough, likely with CAP c/b transaminitis and hepatomegaly, sputum AFB now negative x 3.

## 2019-09-20 LAB
ALBUMIN SERPL ELPH-MCNC: 4.2 G/DL — SIGNIFICANT CHANGE UP (ref 3.3–5)
ALP SERPL-CCNC: 565 U/L — HIGH (ref 40–120)
ALT FLD-CCNC: 575 U/L — HIGH (ref 10–45)
ANION GAP SERPL CALC-SCNC: 12 MMOL/L — SIGNIFICANT CHANGE UP (ref 5–17)
APTT BLD: 31.6 SEC — SIGNIFICANT CHANGE UP (ref 27.5–36.3)
AST SERPL-CCNC: 254 U/L — HIGH (ref 10–40)
BASOPHILS # BLD AUTO: 0.04 K/UL — SIGNIFICANT CHANGE UP (ref 0–0.2)
BASOPHILS NFR BLD AUTO: 0.4 % — SIGNIFICANT CHANGE UP (ref 0–2)
BILIRUB SERPL-MCNC: 0.4 MG/DL — SIGNIFICANT CHANGE UP (ref 0.2–1.2)
BUN SERPL-MCNC: 12 MG/DL — SIGNIFICANT CHANGE UP (ref 7–23)
CALCIUM SERPL-MCNC: 10 MG/DL — SIGNIFICANT CHANGE UP (ref 8.4–10.5)
CHLORIDE SERPL-SCNC: 98 MMOL/L — SIGNIFICANT CHANGE UP (ref 96–108)
CO2 SERPL-SCNC: 25 MMOL/L — SIGNIFICANT CHANGE UP (ref 22–31)
CREAT SERPL-MCNC: 0.62 MG/DL — SIGNIFICANT CHANGE UP (ref 0.5–1.3)
EOSINOPHIL # BLD AUTO: 0.17 K/UL — SIGNIFICANT CHANGE UP (ref 0–0.5)
EOSINOPHIL NFR BLD AUTO: 1.8 % — SIGNIFICANT CHANGE UP (ref 0–6)
GLUCOSE SERPL-MCNC: 96 MG/DL — SIGNIFICANT CHANGE UP (ref 70–99)
HBV CORE AB SER-ACNC: SIGNIFICANT CHANGE UP
HCT VFR BLD CALC: 40.6 % — SIGNIFICANT CHANGE UP (ref 34.5–45)
HEV IGM SER QL: SIGNIFICANT CHANGE UP
HGB BLD-MCNC: 13 G/DL — SIGNIFICANT CHANGE UP (ref 11.5–15.5)
IMM GRANULOCYTES NFR BLD AUTO: 1.9 % — HIGH (ref 0–1.5)
INR BLD: 1.04 RATIO — SIGNIFICANT CHANGE UP (ref 0.88–1.16)
LYMPHOCYTES # BLD AUTO: 1.54 K/UL — SIGNIFICANT CHANGE UP (ref 1–3.3)
LYMPHOCYTES # BLD AUTO: 16 % — SIGNIFICANT CHANGE UP (ref 13–44)
MAGNESIUM SERPL-MCNC: 2.5 MG/DL — SIGNIFICANT CHANGE UP (ref 1.6–2.6)
MCHC RBC-ENTMCNC: 29.7 PG — SIGNIFICANT CHANGE UP (ref 27–34)
MCHC RBC-ENTMCNC: 32 GM/DL — SIGNIFICANT CHANGE UP (ref 32–36)
MCV RBC AUTO: 92.9 FL — SIGNIFICANT CHANGE UP (ref 80–100)
MONOCYTES # BLD AUTO: 0.53 K/UL — SIGNIFICANT CHANGE UP (ref 0–0.9)
MONOCYTES NFR BLD AUTO: 5.5 % — SIGNIFICANT CHANGE UP (ref 2–14)
NEUTROPHILS # BLD AUTO: 7.16 K/UL — SIGNIFICANT CHANGE UP (ref 1.8–7.4)
NEUTROPHILS NFR BLD AUTO: 74.4 % — SIGNIFICANT CHANGE UP (ref 43–77)
PHOSPHATE SERPL-MCNC: 3.7 MG/DL — SIGNIFICANT CHANGE UP (ref 2.5–4.5)
PLATELET # BLD AUTO: 442 K/UL — HIGH (ref 150–400)
POTASSIUM SERPL-MCNC: 4.3 MMOL/L — SIGNIFICANT CHANGE UP (ref 3.5–5.3)
POTASSIUM SERPL-SCNC: 4.3 MMOL/L — SIGNIFICANT CHANGE UP (ref 3.5–5.3)
PROT SERPL-MCNC: 7.7 G/DL — SIGNIFICANT CHANGE UP (ref 6–8.3)
PROTHROM AB SERPL-ACNC: 12 SEC — SIGNIFICANT CHANGE UP (ref 10–13.1)
RBC # BLD: 4.37 M/UL — SIGNIFICANT CHANGE UP (ref 3.8–5.2)
RBC # FLD: 12.7 % — SIGNIFICANT CHANGE UP (ref 10.3–14.5)
SODIUM SERPL-SCNC: 135 MMOL/L — SIGNIFICANT CHANGE UP (ref 135–145)
WBC # BLD: 9.62 K/UL — SIGNIFICANT CHANGE UP (ref 3.8–10.5)
WBC # FLD AUTO: 9.62 K/UL — SIGNIFICANT CHANGE UP (ref 3.8–10.5)

## 2019-09-20 PROCEDURE — 99232 SBSQ HOSP IP/OBS MODERATE 35: CPT

## 2019-09-20 PROCEDURE — 74183 MRI ABD W/O CNTR FLWD CNTR: CPT | Mod: 26

## 2019-09-20 PROCEDURE — 99233 SBSQ HOSP IP/OBS HIGH 50: CPT | Mod: GC

## 2019-09-20 PROCEDURE — 99222 1ST HOSP IP/OBS MODERATE 55: CPT | Mod: GC

## 2019-09-20 NOTE — PROGRESS NOTE ADULT - PROBLEM SELECTOR PLAN 2
Pt w/ transaminitis initially discovered during ED visit on 9/12. CT imaging reveals hepatomegaly. EBV serology sent in the ED, LFTs overall downtrending, stable from yesterday  - Appreciate hepatology recs- MRCP ordered, will f/u results  - RUQ US read as wnl  - Acute hepatitis panel- negative  -  CINTHYA, ASMA, LKM Ab, IgG subsets, quant IgM, IgA, AMA to r/o autoimmune disease  -  alpha-1-antityrypsin, ceruloplasmin- wnl, high ferritin  - avoid all hepatotoxic agents  - Trend CMP, CBC, coags  - Repeat LFTs as outpatient in 10-14 days

## 2019-09-20 NOTE — PROGRESS NOTE ADULT - ASSESSMENT
37 yo F w/ no PMH/PSH p/w fevers and cough x 1 week.   associated with fevers, bloody sputum  patient with recent travel to [place](may) and [place](august)  No prior cough,no weight loss/night sweats  Was on tylenol 6 tabs/day X 1 week  labs as above  No leucocytosis  Transaminitis  CT with RUL ? multifocal pna, hepatomegaly  s/p amox,cephlexin as OP x 3 days   stable hemodynamically  HIV negative   Clinically improving  LFTs still high  sputum cx normal [place]  continue po levaquin-total 7 day course  transaminitis per primary team  hepatology eval noted  Hep E IgG positive-IgM pending   possible liver Bx as OP  Will tailor plan for ID issues  per course,results.Will defer to primary team on management of other issues.  case d/w Dr elam  Infectious Diseases Service will cover over weekend.  Please call [phone] if issues

## 2019-09-20 NOTE — PROGRESS NOTE ADULT - PROBLEM SELECTOR PLAN 1
Pt p/w fevers and cough (not septic) found to have RUL PNA on CT imaging  - AFB negative x3, r/o TB, no isolation required  - Appreciate ID recs- continue PO levaquin (end date 9/21)  - BCx and UCx NGTD

## 2019-09-20 NOTE — PROGRESS NOTE ADULT - ASSESSMENT
Pt is a 39 yo F w/ no PMH p/w fevers and progressively worsening cough, likely with CAP c/b transaminitis and hepatomegaly, sputum AFB now negative x 3, awaiting MRCP and further hepatololgy evaluation.

## 2019-09-20 NOTE — PROGRESS NOTE ADULT - SUBJECTIVE AND OBJECTIVE BOX
HUMBERTO Trejo, PGY 1  Pager [phone]    Patient is a 38y old  Female who presents with a chief complaint of 38F w/ fever and productive cough (16 Sep 2019 01:07)    SUBJECTIVE / OVERNIGHT EVENTS: No acute events overnight. Patient seen and examined at bedside this AM, A&Ox3. On evaluation, patient reports continued improvement of her cough. Denies blood in sputum. Awaiting MRCP and hepatology attending evaluation. Denies fevers/chills/night sweats, CP, SOB, abdominal pain, nausea/vomiting/diarrhea.    MEDICATIONS  (STANDING):  heparin  Injectable 5000 Unit(s) SubCutaneous every 8 hours  influenza   Vaccine 0.5 milliLiter(s) IntraMuscular once  levoFLOXacin  Tablet 750 milliGRAM(s) Oral every 24 hours    MEDICATIONS  (PRN):  ibuprofen  Tablet. 400 milliGRAM(s) Oral every 6 hours PRN Moderate Pain (4 - 6)    Vital Signs Last 24 Hrs  T(C): 36.7 (20 Sep 2019 04:45), Max: 37 (19 Sep 2019 20:43)  T(F): 98.1 (20 Sep 2019 04:45), Max: 98.6 (19 Sep 2019 20:43)  HR: 68 (20 Sep 2019 04:45) (62 - 76)  BP: 101/60 (20 Sep 2019 04:45) (101/60 - 114/66)  RR: 18 (20 Sep 2019 04:45) (17 - 18)  SpO2: 96% (20 Sep 2019 04:45) (96% - 98%)    PHYSICAL EXAM  GENERAL: NAD, well-developed  NEURO: A&Ox3, motor strength intact in 4/4 extremities, sensation intact  HEAD:  Atraumatic, Normocephalic  CHEST/LUNG: CTABL, no rales, wheezes, rhonchi  HEART: Regular rate and rhythm; No murmurs, rubs, or gallops  ABDOMEN: Soft, Nontender, Nondistended; Bowel sounds present, no masses.  EXTREMITIES:  2+ Peripheral Pulses, No clubbing, cyanosis, or edema  SKIN: Warm, dry, intact, no rashes or lesions  PSYCH: affect and behavior appropriate for setting    LABS:                        12.5   6.76  )-----------( 425      ( 19 Sep 2019 08:36 )             38.6     09-20    135  |  98  |  12  ----------------------------<  96  4.3   |  25  |  0.62    Ca    10.0      20 Sep 2019 06:17  Phos  3.7     09-20  Mg     2.5     09-20    TPro  7.7  /  Alb  4.2  /  TBili  0.4  /  DBili  x   /  AST  254<H>  /  ALT  575<H>  /  AlkPhos  565<H>  09-20         Culture Results:   Culture is being performed. (09-17 @ 01:16)  Culture Results:   Normal Respiratory Opal present (09-16 @ 22:00)    Sputum culture- gram variable coccobacilli  BCx- NGTD  UCx- NGTD             Urine legionella- negative    Sputum AFB negative x3    < from: CT Chest w/ IV Cont (09.15.19 @ 21:41) >  IMPRESSION:     CT chest: Right upper lobe pneumonia with small nodular opacities in the   superior segment of the right lower lobe, likely reflecting multifocal   infection. Follow-up chest CT in 6-8 weeks recommended to ensure   resolution.    CT abdomen and pelvis: No acute intra-abdominal pathology. Hepatomegaly   measuring 18.7 cm    < from: Xray Chest 2 Views PA/Lat (09.15.19 @ 18:51) >    IMPRESSION:     New right upper lobe opacity compatible with pneumonia.    < from: US Abdomen Upper Quadrant Right (09.17.19 @ 14:02) >  IMPRESSION:     Normal right upper quadrant abdominal ultrasound.    No duplex evidence of portal or hepatic venous thrombosis.

## 2019-09-20 NOTE — PROGRESS NOTE ADULT - SUBJECTIVE AND OBJECTIVE BOX
Patient is a 38y old  Female who presents with a chief complaint of 38F w/ fever and productive cough (20 Sep 2019 08:15)    Being followed by ID for pna    Interval history:feels well  cough decreased   No acute events      ROS:  No sputum ,SOB,CP  No N/V/D./abd pain  No other complaints      Antimicrobials:    levoFLOXacin  Tablet 750 milliGRAM(s) Oral every 24 hours    Other medications reviewed    Vital Signs Last 24 Hrs  T(C): 36.5 (09-20-19 @ 11:12), Max: 37 (09-19-19 @ 20:43)  T(F): 97.7 (09-20-19 @ 11:12), Max: 98.6 (09-19-19 @ 20:43)  HR: 90 (09-20-19 @ 11:12) (62 - 90)  BP: 97/67 (09-20-19 @ 11:12) (97/67 - 114/66)  BP(mean): --  RR: 18 (09-20-19 @ 11:12) (17 - 18)  SpO2: 96% (09-20-19 @ 11:12) (96% - 98%)    Physical Exam:        HEENT PERRLA EOMI    No oral exudate or erythema    Chest Good AE,CTA    CVS RRR S1 S2 WNl No murmur or rub or gallop    Abd soft BS normal No tenderness no masses    IV site no erythema tenderness or discharge    CNS AAO X 3 no focal    Lab Data:                          13.0   9.62  )-----------( 442      ( 20 Sep 2019 08:45 )             40.6       09-20    135  |  98  |  12  ----------------------------<  96  4.3   |  25  |  0.62    Ca    10.0      20 Sep 2019 06:17  Phos  3.7     09-20  Mg     2.5     09-20    TPro  7.7  /  Alb  4.2  /  TBili  0.4  /  DBili  x   /  AST  254<H>  /  ALT  575<H>  /  AlkPhos  565<H>  09-20        Acid Fast Bacilli Smear:   No acid fast bacilli seen by fluorochrome stain (09.17.19 @ 01:16)    Culture - Sputum (collected 16 Sep 2019 22:00)  Source: .Sputum  Gram Stain (17 Sep 2019 01:53):    Moderate polymorphonuclear leukocytes per low power field    Few Squamous epithelial cells per low power field    Moderate Gram Variable Coccobacilli seen per oil power field  Final Report (18 Sep 2019 19:53):    Normal Respiratory Opal present    Culture - Acid Fast - Sputum w/Smear (collected 16 Sep 2019 17:10)  Source: .Sputum  Preliminary Report (18 Sep 2019 15:04):    Culture is being performed.      Culture - Urine (collected 16 Sep 2019 00:04)  Source: .Urine  Final Report (17 Sep 2019 18:30):    Order received in error- Urine culture specimen processed was collected    from another account.    Previously reported as: No growth    Culture - Blood (collected 15 Sep 2019 23:33)  Source: .Blood  Preliminary Report (17 Sep 2019 01:01):    No growth to date.    Culture - Blood (collected 15 Sep 2019 23:33)  Source: .Blood  Preliminary Report (17 Sep 2019 01:01):    No growth to date.        Culture - Acid Fast - Sputum w/Smear . (09.16.19 @ 17:10)    Specimen Source: .Sputum    Acid Fast Bacilli Smear:   No acid fast bacilli seen by fluorochrome stain    Culture Results:   Culture is being performed.            Culture - Acid Fast - Sputum w/Smear . (09.16.19 @ 06:33)    Specimen Source: .Sputum    Acid Fast Bacilli Smear:   No acid fast bacilli seen by fluorochrome stain    Culture Results:   Culture is being performed.      Hepatitis E IgG Antibodies (09.16.19 @ 15:21)    Hepatitis E IgG Antibodies: Positive: Result indicates recent or past hepatitis E.  -------------------ADDITIONAL INFORMATION-------------------  This test was developed and its performance characteristics  determined by [facility] in a manner consistent with CLIA  requirements. This test has not been cleared or approved by  the U.S. Food and Drug Administration.  Test Performed by:  [facility] - [address] [address]  : Matt Acevedo M.D. Ph.D.; CLIA# [id number]

## 2019-09-21 ENCOUNTER — TRANSCRIPTION ENCOUNTER (OUTPATIENT)
Age: 39
End: 2019-09-21

## 2019-09-21 VITALS
OXYGEN SATURATION: 97 % | HEART RATE: 70 BPM | DIASTOLIC BLOOD PRESSURE: 59 MMHG | SYSTOLIC BLOOD PRESSURE: 100 MMHG | TEMPERATURE: 98 F | RESPIRATION RATE: 18 BRPM

## 2019-09-21 LAB
ALBUMIN SERPL ELPH-MCNC: 3.8 G/DL — SIGNIFICANT CHANGE UP (ref 3.3–5)
ALP SERPL-CCNC: 504 U/L — HIGH (ref 40–120)
ALT FLD-CCNC: 433 U/L — HIGH (ref 10–45)
ANION GAP SERPL CALC-SCNC: 15 MMOL/L — SIGNIFICANT CHANGE UP (ref 5–17)
AST SERPL-CCNC: 128 U/L — HIGH (ref 10–40)
BASOPHILS # BLD AUTO: 0.03 K/UL — SIGNIFICANT CHANGE UP (ref 0–0.2)
BASOPHILS NFR BLD AUTO: 0.4 % — SIGNIFICANT CHANGE UP (ref 0–2)
BILIRUB SERPL-MCNC: 0.4 MG/DL — SIGNIFICANT CHANGE UP (ref 0.2–1.2)
BUN SERPL-MCNC: 13 MG/DL — SIGNIFICANT CHANGE UP (ref 7–23)
CALCIUM SERPL-MCNC: 9.5 MG/DL — SIGNIFICANT CHANGE UP (ref 8.4–10.5)
CHLORIDE SERPL-SCNC: 99 MMOL/L — SIGNIFICANT CHANGE UP (ref 96–108)
CO2 SERPL-SCNC: 23 MMOL/L — SIGNIFICANT CHANGE UP (ref 22–31)
CREAT SERPL-MCNC: 0.61 MG/DL — SIGNIFICANT CHANGE UP (ref 0.5–1.3)
CULTURE RESULTS: SIGNIFICANT CHANGE UP
CULTURE RESULTS: SIGNIFICANT CHANGE UP
EOSINOPHIL # BLD AUTO: 0.13 K/UL — SIGNIFICANT CHANGE UP (ref 0–0.5)
EOSINOPHIL NFR BLD AUTO: 1.6 % — SIGNIFICANT CHANGE UP (ref 0–6)
GLUCOSE SERPL-MCNC: 97 MG/DL — SIGNIFICANT CHANGE UP (ref 70–99)
HBV DNA # SERPL NAA+PROBE: SIGNIFICANT CHANGE UP
HBV DNA SERPL NAA+PROBE-LOG#: SIGNIFICANT CHANGE UP LOGIU/ML
HCT VFR BLD CALC: 38.5 % — SIGNIFICANT CHANGE UP (ref 34.5–45)
HGB BLD-MCNC: 12.5 G/DL — SIGNIFICANT CHANGE UP (ref 11.5–15.5)
IMM GRANULOCYTES NFR BLD AUTO: 1.7 % — HIGH (ref 0–1.5)
LYMPHOCYTES # BLD AUTO: 1.8 K/UL — SIGNIFICANT CHANGE UP (ref 1–3.3)
LYMPHOCYTES # BLD AUTO: 22.3 % — SIGNIFICANT CHANGE UP (ref 13–44)
MAGNESIUM SERPL-MCNC: 2.5 MG/DL — SIGNIFICANT CHANGE UP (ref 1.6–2.6)
MCHC RBC-ENTMCNC: 30.5 PG — SIGNIFICANT CHANGE UP (ref 27–34)
MCHC RBC-ENTMCNC: 32.5 GM/DL — SIGNIFICANT CHANGE UP (ref 32–36)
MCV RBC AUTO: 93.9 FL — SIGNIFICANT CHANGE UP (ref 80–100)
MONOCYTES # BLD AUTO: 0.49 K/UL — SIGNIFICANT CHANGE UP (ref 0–0.9)
MONOCYTES NFR BLD AUTO: 6.1 % — SIGNIFICANT CHANGE UP (ref 2–14)
NEUTROPHILS # BLD AUTO: 5.47 K/UL — SIGNIFICANT CHANGE UP (ref 1.8–7.4)
NEUTROPHILS NFR BLD AUTO: 67.9 % — SIGNIFICANT CHANGE UP (ref 43–77)
PHOSPHATE SERPL-MCNC: 3.2 MG/DL — SIGNIFICANT CHANGE UP (ref 2.5–4.5)
PLATELET # BLD AUTO: 430 K/UL — HIGH (ref 150–400)
POTASSIUM SERPL-MCNC: 4.2 MMOL/L — SIGNIFICANT CHANGE UP (ref 3.5–5.3)
POTASSIUM SERPL-SCNC: 4.2 MMOL/L — SIGNIFICANT CHANGE UP (ref 3.5–5.3)
PROT SERPL-MCNC: 7.3 G/DL — SIGNIFICANT CHANGE UP (ref 6–8.3)
RBC # BLD: 4.1 M/UL — SIGNIFICANT CHANGE UP (ref 3.8–5.2)
RBC # FLD: 13 % — SIGNIFICANT CHANGE UP (ref 10.3–14.5)
SODIUM SERPL-SCNC: 137 MMOL/L — SIGNIFICANT CHANGE UP (ref 135–145)
SPECIMEN SOURCE: SIGNIFICANT CHANGE UP
SPECIMEN SOURCE: SIGNIFICANT CHANGE UP
WBC # BLD: 8.06 K/UL — SIGNIFICANT CHANGE UP (ref 3.8–10.5)
WBC # FLD AUTO: 8.06 K/UL — SIGNIFICANT CHANGE UP (ref 3.8–10.5)

## 2019-09-21 PROCEDURE — 87798 DETECT AGENT NOS DNA AMP: CPT

## 2019-09-21 PROCEDURE — 87449 NOS EACH ORGANISM AG IA: CPT

## 2019-09-21 PROCEDURE — 81332 SERPINA1 GENE: CPT

## 2019-09-21 PROCEDURE — 87640 STAPH A DNA AMP PROBE: CPT

## 2019-09-21 PROCEDURE — 80076 HEPATIC FUNCTION PANEL: CPT

## 2019-09-21 PROCEDURE — 85730 THROMBOPLASTIN TIME PARTIAL: CPT

## 2019-09-21 PROCEDURE — 82728 ASSAY OF FERRITIN: CPT

## 2019-09-21 PROCEDURE — 87040 BLOOD CULTURE FOR BACTERIA: CPT

## 2019-09-21 PROCEDURE — 83605 ASSAY OF LACTIC ACID: CPT

## 2019-09-21 PROCEDURE — 93975 VASCULAR STUDY: CPT

## 2019-09-21 PROCEDURE — 87389 HIV-1 AG W/HIV-1&-2 AB AG IA: CPT

## 2019-09-21 PROCEDURE — 87206 SMEAR FLUORESCENT/ACID STAI: CPT

## 2019-09-21 PROCEDURE — 84100 ASSAY OF PHOSPHORUS: CPT

## 2019-09-21 PROCEDURE — 93005 ELECTROCARDIOGRAM TRACING: CPT

## 2019-09-21 PROCEDURE — 86664 EPSTEIN-BARR NUCLEAR ANTIGEN: CPT

## 2019-09-21 PROCEDURE — 86381 MITOCHONDRIAL ANTIBODY EACH: CPT

## 2019-09-21 PROCEDURE — 99285 EMERGENCY DEPT VISIT HI MDM: CPT | Mod: 25

## 2019-09-21 PROCEDURE — 83735 ASSAY OF MAGNESIUM: CPT

## 2019-09-21 PROCEDURE — 86663 EPSTEIN-BARR ANTIBODY: CPT

## 2019-09-21 PROCEDURE — 83690 ASSAY OF LIPASE: CPT

## 2019-09-21 PROCEDURE — 87486 CHLMYD PNEUM DNA AMP PROBE: CPT

## 2019-09-21 PROCEDURE — 86665 EPSTEIN-BARR CAPSID VCA: CPT

## 2019-09-21 PROCEDURE — 71260 CT THORAX DX C+: CPT

## 2019-09-21 PROCEDURE — 87015 SPECIMEN INFECT AGNT CONCNTJ: CPT

## 2019-09-21 PROCEDURE — 86709 HEPATITIS A IGM ANTIBODY: CPT

## 2019-09-21 PROCEDURE — A9585: CPT

## 2019-09-21 PROCEDURE — 87641 MR-STAPH DNA AMP PROBE: CPT

## 2019-09-21 PROCEDURE — 80053 COMPREHEN METABOLIC PANEL: CPT

## 2019-09-21 PROCEDURE — 86704 HEP B CORE ANTIBODY TOTAL: CPT

## 2019-09-21 PROCEDURE — 85610 PROTHROMBIN TIME: CPT

## 2019-09-21 PROCEDURE — 71046 X-RAY EXAM CHEST 2 VIEWS: CPT

## 2019-09-21 PROCEDURE — 87517 HEPATITIS B DNA QUANT: CPT

## 2019-09-21 PROCEDURE — 86038 ANTINUCLEAR ANTIBODIES: CPT

## 2019-09-21 PROCEDURE — 83550 IRON BINDING TEST: CPT

## 2019-09-21 PROCEDURE — 87070 CULTURE OTHR SPECIMN AEROBIC: CPT

## 2019-09-21 PROCEDURE — 99239 HOSP IP/OBS DSCHRG MGMT >30: CPT | Mod: GC

## 2019-09-21 PROCEDURE — 82390 ASSAY OF CERULOPLASMIN: CPT

## 2019-09-21 PROCEDURE — 96374 THER/PROPH/DIAG INJ IV PUSH: CPT | Mod: XU

## 2019-09-21 PROCEDURE — 86707 HEPATITIS BE ANTIBODY: CPT

## 2019-09-21 PROCEDURE — 87086 URINE CULTURE/COLONY COUNT: CPT

## 2019-09-21 PROCEDURE — 87581 M.PNEUMON DNA AMP PROBE: CPT

## 2019-09-21 PROCEDURE — 80307 DRUG TEST PRSMV CHEM ANLYZR: CPT

## 2019-09-21 PROCEDURE — 86706 HEP B SURFACE ANTIBODY: CPT

## 2019-09-21 PROCEDURE — 74183 MRI ABD W/O CNTR FLWD CNTR: CPT

## 2019-09-21 PROCEDURE — 83540 ASSAY OF IRON: CPT

## 2019-09-21 PROCEDURE — 86645 CMV ANTIBODY IGM: CPT

## 2019-09-21 PROCEDURE — 76705 ECHO EXAM OF ABDOMEN: CPT

## 2019-09-21 PROCEDURE — 86255 FLUORESCENT ANTIBODY SCREEN: CPT

## 2019-09-21 PROCEDURE — 80061 LIPID PANEL: CPT

## 2019-09-21 PROCEDURE — 74177 CT ABD & PELVIS W/CONTRAST: CPT

## 2019-09-21 PROCEDURE — 87116 MYCOBACTERIA CULTURE: CPT

## 2019-09-21 PROCEDURE — 87350 HEPATITIS BE AG IA: CPT

## 2019-09-21 PROCEDURE — 80074 ACUTE HEPATITIS PANEL: CPT

## 2019-09-21 PROCEDURE — 81001 URINALYSIS AUTO W/SCOPE: CPT

## 2019-09-21 PROCEDURE — 81025 URINE PREGNANCY TEST: CPT

## 2019-09-21 PROCEDURE — 86790 VIRUS ANTIBODY NOS: CPT

## 2019-09-21 PROCEDURE — 85027 COMPLETE CBC AUTOMATED: CPT

## 2019-09-21 PROCEDURE — 83615 LACTATE (LD) (LDH) ENZYME: CPT

## 2019-09-21 PROCEDURE — 82787 IGG 1 2 3 OR 4 EACH: CPT

## 2019-09-21 PROCEDURE — 86376 MICROSOMAL ANTIBODY EACH: CPT

## 2019-09-21 PROCEDURE — 86708 HEPATITIS A ANTIBODY: CPT

## 2019-09-21 PROCEDURE — 87633 RESP VIRUS 12-25 TARGETS: CPT

## 2019-09-21 PROCEDURE — 80048 BASIC METABOLIC PNL TOTAL CA: CPT

## 2019-09-21 PROCEDURE — 84443 ASSAY THYROID STIM HORMONE: CPT

## 2019-09-21 NOTE — PROGRESS NOTE ADULT - PROVIDER SPECIALTY LIST ADULT
Infectious Disease
Internal Medicine
Infectious Disease

## 2019-09-21 NOTE — PROGRESS NOTE ADULT - PROBLEM SELECTOR PLAN 3
Pt being treated for UTI. 9/16 is day 5 of antibiotics.   - Abx as above  - Pt currently asymptomatic.
Pt being treated for UTI. 9/17 is day 6 of antibiotics.   - Abx as above  - Pt currently asymptomatic.
Pt being treated for UTI. 9/18 is day 7 of antibiotics.   - Abx as above  - Pt currently asymptomatic.
Pt being treated for UTI. 9/21 is day 10 of antibiotics.   - Abx as above  - Pt currently asymptomatic.
Pt being treated for UTI. 9/20 is day 9 of antibiotics.   - Abx as above  - Pt currently asymptomatic.
Pt being treated for UTI. 9/19 is day 8 of antibiotics.   - Abx as above  - Pt currently asymptomatic.

## 2019-09-21 NOTE — PROGRESS NOTE ADULT - ASSESSMENT
Pt is a 37 yo F w/ no PMH p/w fevers and progressively worsening cough, likely with CAP c/b transaminitis and hepatomegaly, sputum AFB now negative x 3, awaiting MRCP interpretation. Pt is a 39 yo F w/ no PMH p/w fevers and progressively worsening cough, likely with CAP c/b transaminitis and hepatomegaly, sputum AFB now negative x 3, MRCP negative.

## 2019-09-21 NOTE — PROGRESS NOTE ADULT - PROBLEM SELECTOR PROBLEM 1
Pneumonia of upper lobe of lung

## 2019-09-21 NOTE — DISCHARGE NOTE NURSING/CASE MANAGEMENT/SOCIAL WORK - PATIENT PORTAL LINK FT
You can access the FollowMyHealth Patient Portal offered by [facility] by registering at the following website: [email or web address]. By joining [facility]’s FollowMyHealth portal, you will also be able to view your health information using other applications (apps) compatible with our system.

## 2019-09-21 NOTE — PROGRESS NOTE ADULT - PROBLEM SELECTOR PLAN 4
DVT: HSQ  Diet: regular  Code: full  Dispo: Home

## 2019-09-21 NOTE — PROGRESS NOTE ADULT - ATTENDING COMMENTS
Hep w/u in progress, awaiting MRI/MRCP.  No objection to outpatient w/u if ok with hepatology.  Continue Levaquin for PNA, total d/c time 45 minutes.
PNA symptoms have improved, however transaminases worse.  Synthetic liver function preserved, w/u to date has been normal, except for evidence of old Hep A, E, and immunization against Hep B.  Will ask for hepatology eval.
MRCP normal, LFTs stable and pt clinically improving. DC home today, has appt with Hepatology next Tuesday.

## 2019-09-21 NOTE — PROGRESS NOTE ADULT - PROBLEM SELECTOR PLAN 2
Pt w/ transaminitis initially discovered during ED visit on 9/12. CT imaging reveals hepatomegaly. EBV serology sent in the ED, LFTs downtrending  - Appreciate hepatology recs- MRCP performed, will f/u results  - RUQ US read as wnl  - Acute hepatitis panel- negative  -  CINTHYA, ASMA, LKM Ab, IgG subsets, quant IgM, IgA, AMA to r/o autoimmune disease  -  alpha-1-antityrypsin, ceruloplasmin- wnl, high ferritin  - avoid all hepatotoxic agents  - Trend CMP, CBC, coags  - Repeat LFTs as outpatient in 10-14 days

## 2019-09-21 NOTE — PROGRESS NOTE ADULT - REASON FOR ADMISSION
38F w/ fever and productive cough

## 2019-09-21 NOTE — PROGRESS NOTE ADULT - SUBJECTIVE AND OBJECTIVE BOX
HUMBERTO Trejo, PGY 1  Pager [phone]    Patient is a 38y old  Female who presents with a chief complaint of 38F w/ fever and productive cough (16 Sep 2019 01:07)    SUBJECTIVE / OVERNIGHT EVENTS: No acute events overnight. Patient seen and examined at bedside this AM, A&Ox3. On evaluation, patient states she feels well, cough is much improved, denies hemoptysis, night sweats, fevers/chills. Awaiting MRCP read. Denies chest pain, SOB, abdominal pain, nausea/vomiting/diarrhea.    MEDICATIONS  (STANDING):  heparin  Injectable 5000 Unit(s) SubCutaneous every 8 hours  influenza   Vaccine 0.5 milliLiter(s) IntraMuscular once  levoFLOXacin  Tablet 750 milliGRAM(s) Oral every 24 hours    MEDICATIONS  (PRN):  ibuprofen  Tablet. 400 milliGRAM(s) Oral every 6 hours PRN Moderate Pain (4 - 6)    Vital Signs Last 24 Hrs  T(C): 36.8 (21 Sep 2019 04:58), Max: 36.8 (21 Sep 2019 04:58)  T(F): 98.2 (21 Sep 2019 04:58), Max: 98.2 (21 Sep 2019 04:58)  HR: 70 (21 Sep 2019 04:58) (70 - 90)  BP: 95/62 (21 Sep 2019 04:58) (95/62 - 103/69)  RR: 18 (21 Sep 2019 04:58) (18 - 18)  SpO2: 97% (21 Sep 2019 04:58) (96% - 97%)    PHYSICAL EXAM  GENERAL: NAD, well-developed  NEURO: A&Ox3, motor strength intact in 4/4 extremities, sensation intact  HEAD:  Atraumatic, Normocephalic  CHEST/LUNG: CTABL, no rales, wheezes, rhonchi  HEART: Regular rate and rhythm; No murmurs, rubs, or gallops  ABDOMEN: Soft, Nontender, Nondistended; Bowel sounds present, no masses.  EXTREMITIES:  2+ Peripheral Pulses, No clubbing, cyanosis, or edema  SKIN: Warm, dry, intact, no rashes or lesions  PSYCH: affect and behavior appropriate for setting    LABS:                      13.0   9.62  )-----------( 442      ( 20 Sep 2019 08:45 )             40.6     09-21    137  |  99  |  13  ----------------------------<  97  4.2   |  23  |  0.61    Ca    9.5      21 Sep 2019 06:35  Phos  3.2     09-21  Mg     2.5     09-21    TPro  7.3  /  Alb  3.8  /  TBili  0.4  /  DBili  x   /  AST  128<H>  /  ALT  433<H>  /  AlkPhos  504<H>  09-21       PT/INR - ( 20 Sep 2019 09:33 )   PT: 12.0 sec;   INR: 1.04 ratio      PTT - ( 20 Sep 2019 09:33 )  PTT:31.6 sec            Culture Results:   Culture is being performed. (09-17 @ 01:16)  Culture Results:   Normal Respiratory Opal present (09-16 @ 22:00)  Culture Results:   Culture is being performed. (09-16 @ 17:10)  Culture Results:   Culture is being performed. (09-16 @ 06:33)  Culture Results:   Order received in error- Urine culture specimen processed was collected  from another account.  Previously reported as: No growth (09-16 @ 00:04)  Culture Results:   No growth at 5 days. (09-15 @ 23:33)  Culture Results:   No growth at 5 days. (09-15 @ 23:33)  Culture Results:   No growth (09-12 @ 04:51)    Sputum culture- gram variable coccobacilli  BCx- NGTD  UCx- NGTD             Urine legionella- negative    Sputum AFB negative x3    < from: CT Chest w/ IV Cont (09.15.19 @ 21:41) >  IMPRESSION:     CT chest: Right upper lobe pneumonia with small nodular opacities in the   superior segment of the right lower lobe, likely reflecting multifocal   infection. Follow-up chest CT in 6-8 weeks recommended to ensure   resolution.    CT abdomen and pelvis: No acute intra-abdominal pathology. Hepatomegaly   measuring 18.7 cm    < from: Xray Chest 2 Views PA/Lat (09.15.19 @ 18:51) >    IMPRESSION:     New right upper lobe opacity compatible with pneumonia.    < from: US Abdomen Upper Quadrant Right (09.17.19 @ 14:02) >  IMPRESSION:     Normal right upper quadrant abdominal ultrasound.    No duplex evidence of portal or hepatic venous thrombosis. HUMBERTO Trejo, PGY 1  Pager [phone]    Patient is a 38y old  Female who presents with a chief complaint of 38F w/ fever and productive cough (16 Sep 2019 01:07)    SUBJECTIVE / OVERNIGHT EVENTS: No acute events overnight. Patient seen and examined at bedside this AM, A&Ox3. On evaluation, patient states she feels well, cough is much improved, denies hemoptysis, night sweats, fevers/chills. Awaiting MRCP read. Denies chest pain, SOB, abdominal pain, nausea/vomiting/diarrhea.    MEDICATIONS  (STANDING):  heparin  Injectable 5000 Unit(s) SubCutaneous every 8 hours  influenza   Vaccine 0.5 milliLiter(s) IntraMuscular once  levoFLOXacin  Tablet 750 milliGRAM(s) Oral every 24 hours    MEDICATIONS  (PRN):  ibuprofen  Tablet. 400 milliGRAM(s) Oral every 6 hours PRN Moderate Pain (4 - 6)    Vital Signs Last 24 Hrs  T(C): 36.8 (21 Sep 2019 04:58), Max: 36.8 (21 Sep 2019 04:58)  T(F): 98.2 (21 Sep 2019 04:58), Max: 98.2 (21 Sep 2019 04:58)  HR: 70 (21 Sep 2019 04:58) (70 - 90)  BP: 95/62 (21 Sep 2019 04:58) (95/62 - 103/69)  RR: 18 (21 Sep 2019 04:58) (18 - 18)  SpO2: 97% (21 Sep 2019 04:58) (96% - 97%)    PHYSICAL EXAM  GENERAL: NAD, well-developed  NEURO: A&Ox3, motor strength intact in 4/4 extremities, sensation intact  HEAD:  Atraumatic, Normocephalic  CHEST/LUNG: CTABL, no rales, wheezes, rhonchi  HEART: Regular rate and rhythm; No murmurs, rubs, or gallops  ABDOMEN: Soft, Nontender, Nondistended; Bowel sounds present, no masses.  EXTREMITIES:  2+ Peripheral Pulses, No clubbing, cyanosis, or edema  SKIN: Warm, dry, intact, no rashes or lesions  PSYCH: affect and behavior appropriate for setting    LABS:                                 12.5   8.06  )-----------( 430      ( 21 Sep 2019 10:46 )             38.5     09-21    137  |  99  |  13  ----------------------------<  97  4.2   |  23  |  0.61    Ca    9.5      21 Sep 2019 06:35  Phos  3.2     09-21  Mg     2.5     09-21    TPro  7.3  /  Alb  3.8  /  TBili  0.4  /  DBili  x   /  AST  128<H>  /  ALT  433<H>  /  AlkPhos  504<H>  09-21       PT/INR - ( 20 Sep 2019 09:33 )   PT: 12.0 sec;   INR: 1.04 ratio      PTT - ( 20 Sep 2019 09:33 )  PTT:31.6 sec    Culture Results:   Culture is being performed. (09-17 @ 01:16)  Culture Results:   Normal Respiratory Opal present (09-16 @ 22:00)  Culture Results:   Culture is being performed. (09-16 @ 17:10)  Culture Results:   Culture is being performed. (09-16 @ 06:33)  Culture Results:   Order received in error- Urine culture specimen processed was collected  from another account.  Previously reported as: No growth (09-16 @ 00:04)  Culture Results:   No growth at 5 days. (09-15 @ 23:33)  Culture Results:   No growth at 5 days. (09-15 @ 23:33)  Culture Results:   No growth (09-12 @ 04:51)    Sputum culture- gram variable coccobacilli  BCx- NGTD  UCx- NGTD             Urine legionella- negative    Sputum AFB negative x3    < from: CT Chest w/ IV Cont (09.15.19 @ 21:41) >  IMPRESSION:     CT chest: Right upper lobe pneumonia with small nodular opacities in the   superior segment of the right lower lobe, likely reflecting multifocal   infection. Follow-up chest CT in 6-8 weeks recommended to ensure   resolution.    CT abdomen and pelvis: No acute intra-abdominal pathology. Hepatomegaly   measuring 18.7 cm    < from: Xray Chest 2 Views PA/Lat (09.15.19 @ 18:51) >    IMPRESSION:     New right upper lobe opacity compatible with pneumonia.    < from: US Abdomen Upper Quadrant Right (09.17.19 @ 14:02) >  IMPRESSION:     Normal right upper quadrant abdominal ultrasound.    No duplex evidence of portal or hepatic venous thrombosis.

## 2019-09-21 NOTE — DISCHARGE NOTE NURSING/CASE MANAGEMENT/SOCIAL WORK - NSDCFUADDAPPT_GEN_ALL_CORE_FT
1. Please follow up with Dr. Frias on Tues September 24th at 1:00PM. Please call the office to give them your demographic information. Please bring you insurance and ID card to the appointment.    You will need to repeat your blood work to ensure your liver enzymes are improving on 9/24. You will need to repeat your chest x-ray in 4-8 weeks to ensure resolution of your pneumonia.    To schedule an appointment with hepatology, call the number listed for the gastroenterology office listed above and ask for an appointment with a hepatologist (a liver doctor).

## 2019-09-24 ENCOUNTER — APPOINTMENT (OUTPATIENT)
Dept: INTERNAL MEDICINE | Facility: CLINIC | Age: 39
End: 2019-09-24
Payer: COMMERCIAL

## 2019-09-24 VITALS
HEART RATE: 97 BPM | WEIGHT: 150 LBS | HEIGHT: 63 IN | DIASTOLIC BLOOD PRESSURE: 75 MMHG | OXYGEN SATURATION: 96 % | TEMPERATURE: 98.1 F | SYSTOLIC BLOOD PRESSURE: 122 MMHG | BODY MASS INDEX: 26.58 KG/M2

## 2019-09-24 DIAGNOSIS — Z00.00 ENCOUNTER FOR GENERAL ADULT MEDICAL EXAMINATION W/OUT ABNORMAL FINDINGS: ICD-10-CM

## 2019-09-24 DIAGNOSIS — Z83.79 FAMILY HISTORY OF OTHER DISEASES OF THE DIGESTIVE SYSTEM: ICD-10-CM

## 2019-09-24 PROCEDURE — 36415 COLL VENOUS BLD VENIPUNCTURE: CPT

## 2019-09-24 PROCEDURE — 99213 OFFICE O/P EST LOW 20 MIN: CPT | Mod: 25

## 2019-09-24 PROCEDURE — 99385 PREV VISIT NEW AGE 18-39: CPT | Mod: 25

## 2019-09-24 PROCEDURE — G0444 DEPRESSION SCREEN ANNUAL: CPT

## 2019-09-24 NOTE — PLAN
[FreeTextEntry1] : PNA, s/p hospitalization and completion of antibiotics\par -repeat CXR in 4 weeks to follow up resolution- script provided\par -clinically improved\par \par Transaminitis\par -repeat LFTs today\par -advised patient to call number provided to her on discharge for hepatologist\par -avoid alcohol, acetaminophen for now\par \par Healthcare Maintenance\par -labs reviewed from inpatient on [facility], will check HbA1C to complete routine labs\par -depression screen negative\par -UTD with pap smear 12/18 normal results per patient\par \par Routine follow up in 6 months or sooner a indicated

## 2019-09-24 NOTE — HISTORY OF PRESENT ILLNESS
[FreeTextEntry1] : establish care, hospital follow up [de-identified] : 39yo F presents to establish care. A few weeks ago she was complaining of fevers and cough and went to her prior PCP who prescribed amoxicillin. The symptoms persisted so she went to ER where she was diagnosed with UTI and discharged with cephalexin. She then went to urgent care on 9/15 with persistent symptoms with Tmax of 104 and was sent to ER where she was admitted for RUL pneumonia. She notably had recent travel to [place]; she was seen by ID inpatient and ruled out for TB after three negative AFB sputum cultures. She was found to have significantly elevated liver enzymes, extensive testing done inpatient including MRCP inpatient with normal results. She was advised to follow up as outpatient with hepatology for possible liver biopsy to further evaluate etiology. She was discharged on 9/21 with three more days of antibiotics. \par Today, she feels well and overall much improved. She denies anymore fevers, her cough has really improved and no longer any blood in her sputum. Denies any other acute complaints. She has completed the course of antibiotics that were given to her.

## 2019-09-24 NOTE — PHYSICAL EXAM
[No Acute Distress] : no acute distress [Well Nourished] : well nourished [Well Developed] : well developed [Normal Sclera/Conjunctiva] : normal sclera/conjunctiva [Well-Appearing] : well-appearing [PERRL] : pupils equal round and reactive to light [Normal Outer Ear/Nose] : the outer ears and nose were normal in appearance [Normal Oropharynx] : the oropharynx was normal [Supple] : supple [No Respiratory Distress] : no respiratory distress  [No Accessory Muscle Use] : no accessory muscle use [Clear to Auscultation] : lungs were clear to auscultation bilaterally [Normal Rate] : normal rate  [Regular Rhythm] : with a regular rhythm [Normal S1, S2] : normal S1 and S2 [No Edema] : there was no peripheral edema [Soft] : abdomen soft [Non Tender] : non-tender [Non-distended] : non-distended [Normal Bowel Sounds] : normal bowel sounds [No CVA Tenderness] : no CVA  tenderness [Grossly Normal Strength/Tone] : grossly normal strength/tone [Coordination Grossly Intact] : coordination grossly intact [No Focal Deficits] : no focal deficits [Normal Gait] : normal gait [Normal Affect] : the affect was normal [Alert and Oriented x3] : oriented to person, place, and time [Normal Insight/Judgement] : insight and judgment were intact

## 2019-09-24 NOTE — HEALTH RISK ASSESSMENT
[Yes] : Yes [2 - 4 times a month (2 pts)] : 2-4 times a month (2 points) [1 or 2 (0 pts)] : 1 or 2 (0 points) [Never (0 pts)] : Never (0 points) [No] : In the past 12 months have you used drugs other than those required for medical reasons? No [0] : 2) Feeling down, depressed, or hopeless: Not at all (0) [Patient reported PAP Smear was normal] : Patient reported PAP Smear was normal [None] : None [Employed] : employed [With Family] : lives with family [] :  [# Of Children ___] : has [unfilled] children [Feels Safe at Home] : Feels safe at home [Fully functional (using the telephone, shopping, preparing meals, housekeeping, doing laundry, using] : Fully functional and needs no help or supervision to perform IADLs (using the telephone, shopping, preparing meals, housekeeping, doing laundry, using transportation, managing medications and managing finances) [Fully functional (bathing, dressing, toileting, transferring, walking, feeding)] : Fully functional (bathing, dressing, toileting, transferring, walking, feeding) [Smoke Detector] : smoke detector [Carbon Monoxide Detector] : carbon monoxide detector [Seat Belt] :  uses seat belt [] : No [Audit-CScore] : 2 [[id number]] : 0 [Change in mental status noted] : No change in mental status noted [Language] : denies difficulty with language [Reports changes in hearing] : Reports no changes in hearing [Reports changes in vision] : Reports no changes in vision [Reports changes in dental health] : Reports no changes in dental health [PapSmearDate] : 12/18 [HepatitisCDate] : 09/19 [HIVDate] : 09/19 [FreeTextEntry2] : works at a [employer] school, also real estate worker

## 2019-09-25 ENCOUNTER — RESULT REVIEW (OUTPATIENT)
Age: 39
End: 2019-09-25

## 2019-09-25 LAB
ALBUMIN SERPL ELPH-MCNC: 4.4 G/DL
ALP BLD-CCNC: 372 U/L
ALT SERPL-CCNC: 184 U/L
AST SERPL-CCNC: 36 U/L
BILIRUB DIRECT SERPL-MCNC: 0.1 MG/DL
BILIRUB INDIRECT SERPL-MCNC: 0.2 MG/DL
BILIRUB SERPL-MCNC: 0.3 MG/DL
ESTIMATED AVERAGE GLUCOSE: 114 MG/DL
HBA1C MFR BLD HPLC: 5.6 %
PROT SERPL-MCNC: 7.3 G/DL

## 2019-09-27 LAB
MISCELLANEOUS TEST NAME: SIGNIFICANT CHANGE UP
MISCELLANEOUS, NORMALS: SIGNIFICANT CHANGE UP
MISCELLANEOUS, RESULT: SIGNIFICANT CHANGE UP

## 2019-10-10 ENCOUNTER — APPOINTMENT (OUTPATIENT)
Dept: HEPATOLOGY | Facility: CLINIC | Age: 39
End: 2019-10-10
Payer: COMMERCIAL

## 2019-10-10 VITALS
SYSTOLIC BLOOD PRESSURE: 114 MMHG | BODY MASS INDEX: 26.58 KG/M2 | OXYGEN SATURATION: 100 % | WEIGHT: 150 LBS | HEART RATE: 87 BPM | TEMPERATURE: 98.8 F | HEIGHT: 63 IN | DIASTOLIC BLOOD PRESSURE: 65 MMHG

## 2019-10-10 PROCEDURE — 99204 OFFICE O/P NEW MOD 45 MIN: CPT

## 2019-10-10 NOTE — HISTORY OF PRESENT ILLNESS
[de-identified] : Ms. BELLO is a 38 year old woman here for follow-up of elevated liver enzymes that were found when she had pneumonia. \par She developed fever on 9/10 and took tylenol 500 mg, 2 tabs q6hrs, i.e. 4g/day, for a total of 5 days.\par On 9/12, she took amoxicillin once, prescribed by her PCP, then went to the ED the same night (9/12/19) because of high fever. AST/ALT/ALP were 363/329/162. She was given cephalexin for possible UTI, and ketorolac. \par She went again on 9/15 and was hospitalized until 9/21/19. She was treated with vancomycin, ceftriaxone, and azithromycin (once). Before, she took acetaminophen, 2 tabs q6 hrs for 2d, for her fever.\par She was found to have elevated liver enzymes, which improved. Tylenol level was negative.\par Most recent travel was in May, to [place].\par \par 9/12: AST//329; AlkP 162; TBili 0.7\par 9/15: AST//604; AlkP 475; TBili 0.5\par 9/16: AST//556; AlkP 445; TBili 0.5\par 9/17: AST//589; AlkP 529; TBili 0.4\par 9/18: AST//486; AlkP 539; TBili 0.4\par 9/19: AST//552; AlkP 553; TBili 0.4\par 9/24: 35/184, \par \par She drinks Gingseng occasionally, twice a week, but just started this before she got sick.\par Workup:\par - 9/21/19 MRI/MRCP wwo; normal liver and spleen, normal exam.\par negative for acute HAV, HBV (pt is immune), EBV, CMV. HCV Ab neg. HEV IgG is positive, but IgM is pending. In terms of autoimmune markers, IgG wnl, CINTHYA, AMA, Anti smooth muscle Ab, anti LK Ab all negative. Ceruloplasmin and alpha 1 AT are wnl. Ferritin is elevated in 900s, but TSat is 11%. Tylenol level wnl. \par - 9/17/19 US: normal liver.

## 2019-10-10 NOTE — ASSESSMENT
[FreeTextEntry1] : Ms. BELLO is a 38 year old woman with recently elevated liver enzymes in setting of pneumonia.\par \par - likely drug-induced liver injury. Extensive data of recent hospitalization reviewed. Possible cause is Gingseng, or amoxicillin. She took several prescription drugs that can cause liver damage, including amoxicillin, a cough syrup with pain medication (possibly NSAID), ketorolac, zosyn, but she took amoxicillin only 8-10 hours before her LFTs were found elevated, and the others after her liver enzymes were already found elevated on 9/12. The tylenol she took of 4g/d is probably not high enough to cause this. She started taking Gingseng twice a week a couple of weeks before this episode.\par \par - labs today, she will call tomorrow, and if normal, will not need follow-up.\par - avoid Gingseng, caution with amoxicillin - if required, would check LFTs after 1 day before continuing.

## 2019-10-10 NOTE — PHYSICAL EXAM
[General Appearance - Alert] : alert [General Appearance - In No Acute Distress] : in no acute distress [PERRL With Normal Accommodation] : pupils were equal in size, round, and reactive to light [Sclera] : the sclera and conjunctiva were normal [Extraocular Movements] : extraocular movements were intact [Outer Ear] : the ears and nose were normal in appearance [Oropharynx] : the oropharynx was normal [Neck Appearance] : the appearance of the neck was normal [Neck Cervical Mass (___cm)] : no neck mass was observed [Thyroid Diffuse Enlargement] : the thyroid was not enlarged [Jugular Venous Distention Increased] : there was no jugular-venous distention [Thyroid Nodule] : there were no palpable thyroid nodules [Heart Rate And Rhythm] : heart rate was normal and rhythm regular [Heart Sounds] : normal S1 and S2 [Auscultation Breath Sounds / Voice Sounds] : lungs were clear to auscultation bilaterally [Murmurs] : no murmurs [Heart Sounds Gallop] : no gallops [Heart Sounds Pericardial Friction Rub] : no pericardial rub [Full Pulse] : the pedal pulses are present [Edema] : there was no peripheral edema [Bowel Sounds] : normal bowel sounds [Abdomen Soft] : soft [Abdomen Mass (___ Cm)] : no abdominal mass palpated [Abdomen Tenderness] : non-tender [Cervical Lymph Nodes Enlarged Anterior Bilaterally] : anterior cervical [Cervical Lymph Nodes Enlarged Posterior Bilaterally] : posterior cervical [Femoral Lymph Nodes Enlarged Bilaterally] : femoral [Supraclavicular Lymph Nodes Enlarged Bilaterally] : supraclavicular [Axillary Lymph Nodes Enlarged Bilaterally] : axillary [Inguinal Lymph Nodes Enlarged Bilaterally] : inguinal [No CVA Tenderness] : no ~M costovertebral angle tenderness [Nail Clubbing] : no clubbing  or cyanosis of the fingernails [No Spinal Tenderness] : no spinal tenderness [Abnormal Walk] : normal gait [Musculoskeletal - Swelling] : no joint swelling seen [Motor Tone] : muscle strength and tone were normal [Skin Color & Pigmentation] : normal skin color and pigmentation [Skin Turgor] : normal skin turgor [] : no rash [Impaired Insight] : insight and judgment were intact [Oriented To Time, Place, And Person] : oriented to person, place, and time [Affect] : the affect was normal [Spider Angioma] : No spider angioma(s) were observed [Ascites Fluid Wave] : no ascites fluid wave [Splenomegaly] : no splenomegaly [Asterixis] : no asterixis observed [Jaundice] : No jaundice [Palmar Erythema] : no Palmar Erythema [Depression] : no depression [Occult Blood Positive] : stool was negative for occult blood

## 2019-10-11 LAB
ALBUMIN SERPL ELPH-MCNC: 4.8 G/DL
ALP BLD-CCNC: 84 U/L
ALT SERPL-CCNC: 14 U/L
ANION GAP SERPL CALC-SCNC: 14 MMOL/L
AST SERPL-CCNC: 14 U/L
BILIRUB SERPL-MCNC: 0.3 MG/DL
BUN SERPL-MCNC: 17 MG/DL
CALCIUM SERPL-MCNC: 9.4 MG/DL
CERULOPLASMIN SERPL-MCNC: 29 MG/DL
CHLORIDE SERPL-SCNC: 104 MMOL/L
CO2 SERPL-SCNC: 22 MMOL/L
CREAT SERPL-MCNC: 0.83 MG/DL
GLUCOSE SERPL-MCNC: 88 MG/DL
HBV CORE IGG+IGM SER QL: NONREACTIVE
POTASSIUM SERPL-SCNC: 4.3 MMOL/L
PROT SERPL-MCNC: 7.3 G/DL
SODIUM SERPL-SCNC: 140 MMOL/L

## 2019-10-15 ENCOUNTER — FORM ENCOUNTER (OUTPATIENT)
Age: 39
End: 2019-10-15

## 2019-10-16 ENCOUNTER — APPOINTMENT (OUTPATIENT)
Dept: RADIOLOGY | Facility: IMAGING CENTER | Age: 39
End: 2019-10-16
Payer: COMMERCIAL

## 2019-10-16 ENCOUNTER — OUTPATIENT (OUTPATIENT)
Dept: OUTPATIENT SERVICES | Facility: HOSPITAL | Age: 39
LOS: 1 days | End: 2019-10-16
Payer: COMMERCIAL

## 2019-10-16 ENCOUNTER — APPOINTMENT (OUTPATIENT)
Dept: INTERNAL MEDICINE | Facility: CLINIC | Age: 39
End: 2019-10-16

## 2019-10-16 DIAGNOSIS — J18.9 PNEUMONIA, UNSPECIFIED ORGANISM: ICD-10-CM

## 2019-10-16 PROBLEM — Z78.9 OTHER SPECIFIED HEALTH STATUS: Chronic | Status: ACTIVE | Noted: 2019-09-15

## 2019-10-16 LAB — ANA SER IF-ACNC: NEGATIVE

## 2019-10-16 PROCEDURE — 71048 X-RAY EXAM CHEST 4+ VIEWS: CPT

## 2019-10-16 PROCEDURE — 71048 X-RAY EXAM CHEST 4+ VIEWS: CPT | Mod: 26

## 2019-10-17 ENCOUNTER — RESULT REVIEW (OUTPATIENT)
Age: 39
End: 2019-10-17

## 2019-11-04 NOTE — ED ADULT NURSE NOTE - NS ED NOTE ABUSE SUSPICION NEGLECT YN
69 y/o female presented to ED for vaginal spotting s/p pessary removal. No bleeding on pelvic exam. Labs, imaging obtained. No evidence of perforation or acute pathology. Results reviewed and discussed with pt and printed for patient. Anticipatory guidance given including close outpatient followup. Strict return precautions given. Pt verbalizes understanding of and agrees with plan.
No

## 2019-11-06 LAB
CULTURE RESULTS: SIGNIFICANT CHANGE UP
SPECIMEN SOURCE: SIGNIFICANT CHANGE UP

## 2020-02-03 ENCOUNTER — NON-APPOINTMENT (OUTPATIENT)
Age: 40
End: 2020-02-03

## 2020-02-03 ENCOUNTER — APPOINTMENT (OUTPATIENT)
Dept: INTERNAL MEDICINE | Facility: CLINIC | Age: 40
End: 2020-02-03
Payer: COMMERCIAL

## 2020-02-03 VITALS
WEIGHT: 158 LBS | OXYGEN SATURATION: 96 % | HEIGHT: 63 IN | SYSTOLIC BLOOD PRESSURE: 106 MMHG | BODY MASS INDEX: 28 KG/M2 | DIASTOLIC BLOOD PRESSURE: 70 MMHG | TEMPERATURE: 99.5 F | HEART RATE: 85 BPM

## 2020-02-03 PROCEDURE — 99213 OFFICE O/P EST LOW 20 MIN: CPT | Mod: 25

## 2020-02-03 PROCEDURE — 93000 ELECTROCARDIOGRAM COMPLETE: CPT

## 2020-02-03 NOTE — HISTORY OF PRESENT ILLNESS
[FreeTextEntry8] : Patient presents for acute visit. She states that about two days she noted some left anterior chest pain when laying on her left side. She recently started working out again at the gym. The pain is located in one particular spot, only presents when laying on that side otherwise not there. She denies any cough, congestion, heart palpitations. \par

## 2020-02-03 NOTE — PHYSICAL EXAM
[No Acute Distress] : no acute distress [Well Nourished] : well nourished [Well Developed] : well developed [No Respiratory Distress] : no respiratory distress  [No Accessory Muscle Use] : no accessory muscle use [Clear to Auscultation] : lungs were clear to auscultation bilaterally [Normal Rate] : normal rate  [Regular Rhythm] : with a regular rhythm [Normal S1, S2] : normal S1 and S2 [Normal Appearance] : normal in appearance [No Nipple Discharge] : no nipple discharge [No Focal Deficits] : no focal deficits [Alert and Oriented x3] : oriented to person, place, and time [de-identified] : anterior left intercostal muscle point tenderness approx between ribs 4-5

## 2020-02-03 NOTE — PLAN
[FreeTextEntry1] : Atypical chest pain- reproducible, likely costochondritis\par EKG largely unchanged from prior\par no associated symptoms\par recommend ibuprofen q6-8 hours with food x 3 days\par if no improvement call office

## 2020-10-12 ENCOUNTER — APPOINTMENT (OUTPATIENT)
Dept: INTERNAL MEDICINE | Facility: CLINIC | Age: 40
End: 2020-10-12
Payer: COMMERCIAL

## 2020-10-12 VITALS
BODY MASS INDEX: 30.23 KG/M2 | OXYGEN SATURATION: 98 % | HEIGHT: 60 IN | TEMPERATURE: 98.2 F | DIASTOLIC BLOOD PRESSURE: 80 MMHG | WEIGHT: 154 LBS | SYSTOLIC BLOOD PRESSURE: 110 MMHG | HEART RATE: 76 BPM

## 2020-10-12 LAB
25(OH)D3 SERPL-MCNC: 30.3 NG/ML
ALBUMIN SERPL ELPH-MCNC: 5.1 G/DL
ALP BLD-CCNC: 38 U/L
ALT SERPL-CCNC: 13 U/L
ANION GAP SERPL CALC-SCNC: 13 MMOL/L
AST SERPL-CCNC: 16 U/L
BASOPHILS # BLD AUTO: 0.03 K/UL
BASOPHILS NFR BLD AUTO: 0.5 %
BILIRUB SERPL-MCNC: 0.5 MG/DL
BUN SERPL-MCNC: 13 MG/DL
CALCIUM SERPL-MCNC: 9.6 MG/DL
CHLORIDE SERPL-SCNC: 102 MMOL/L
CHOLEST SERPL-MCNC: 206 MG/DL
CHOLEST/HDLC SERPL: 3.3 RATIO
CO2 SERPL-SCNC: 25 MMOL/L
CREAT SERPL-MCNC: 0.64 MG/DL
EOSINOPHIL # BLD AUTO: 0.08 K/UL
EOSINOPHIL NFR BLD AUTO: 1.4 %
ESTIMATED AVERAGE GLUCOSE: 111 MG/DL
GLUCOSE SERPL-MCNC: 93 MG/DL
HBA1C MFR BLD HPLC: 5.5 %
HCT VFR BLD CALC: 43.5 %
HDLC SERPL-MCNC: 63 MG/DL
HGB BLD-MCNC: 14 G/DL
IMM GRANULOCYTES NFR BLD AUTO: 0.2 %
LDLC SERPL CALC-MCNC: 122 MG/DL
LYMPHOCYTES # BLD AUTO: 1.94 K/UL
LYMPHOCYTES NFR BLD AUTO: 32.8 %
MAN DIFF?: NORMAL
MCHC RBC-ENTMCNC: 30.2 PG
MCHC RBC-ENTMCNC: 32.2 GM/DL
MCV RBC AUTO: 93.8 FL
MONOCYTES # BLD AUTO: 0.26 K/UL
MONOCYTES NFR BLD AUTO: 4.4 %
NEUTROPHILS # BLD AUTO: 3.6 K/UL
NEUTROPHILS NFR BLD AUTO: 60.7 %
PLATELET # BLD AUTO: 278 K/UL
POTASSIUM SERPL-SCNC: 4.3 MMOL/L
PROT SERPL-MCNC: 7.4 G/DL
RBC # BLD: 4.64 M/UL
RBC # FLD: 11.8 %
SARS-COV-2 IGG SERPL IA-ACNC: 0.09 INDEX
SARS-COV-2 IGG SERPL QL IA: NEGATIVE
SODIUM SERPL-SCNC: 140 MMOL/L
TRIGL SERPL-MCNC: 101 MG/DL
TSH SERPL-ACNC: 3.29 UIU/ML
WBC # FLD AUTO: 5.92 K/UL

## 2020-10-12 PROCEDURE — 90686 IIV4 VACC NO PRSV 0.5 ML IM: CPT

## 2020-10-12 PROCEDURE — 36415 COLL VENOUS BLD VENIPUNCTURE: CPT

## 2020-10-12 PROCEDURE — 99395 PREV VISIT EST AGE 18-39: CPT | Mod: 25

## 2020-10-12 PROCEDURE — G0008: CPT

## 2020-10-12 PROCEDURE — G0444 DEPRESSION SCREEN ANNUAL: CPT | Mod: NC,59

## 2020-10-12 NOTE — PHYSICAL EXAM
[No Acute Distress] : no acute distress [Well Nourished] : well nourished [Well Developed] : well developed [Well-Appearing] : well-appearing [Normal Sclera/Conjunctiva] : normal sclera/conjunctiva [PERRL] : pupils equal round and reactive to light [Normal Outer Ear/Nose] : the outer ears and nose were normal in appearance [No Respiratory Distress] : no respiratory distress  [No Accessory Muscle Use] : no accessory muscle use [Clear to Auscultation] : lungs were clear to auscultation bilaterally [Normal Rate] : normal rate  [Regular Rhythm] : with a regular rhythm [Normal S1, S2] : normal S1 and S2 [No Edema] : there was no peripheral edema [Soft] : abdomen soft [Non Tender] : non-tender [Non-distended] : non-distended [Normal Bowel Sounds] : normal bowel sounds [No CVA Tenderness] : no CVA  tenderness [Grossly Normal Strength/Tone] : grossly normal strength/tone [Coordination Grossly Intact] : coordination grossly intact [No Focal Deficits] : no focal deficits [Normal Gait] : normal gait [Normal Affect] : the affect was normal [Alert and Oriented x3] : oriented to person, place, and time [Normal Insight/Judgement] : insight and judgment were intact [de-identified] : right sided reproducible anterior chest point tenderness

## 2020-10-12 NOTE — HEALTH RISK ASSESSMENT
[Yes] : Yes [Monthly or less (1 pt)] : Monthly or less (1 point) [1 or 2 (0 pts)] : 1 or 2 (0 points) [Never (0 pts)] : Never (0 points) [No] : In the past 12 months have you used drugs other than those required for medical reasons? No [0] : 2) Feeling down, depressed, or hopeless: Not at all (0) [Patient reported PAP Smear was normal] : Patient reported PAP Smear was normal [None] : None [With Family] : lives with family [Employed] : employed [] :  [# Of Children ___] : has [unfilled] children [Feels Safe at Home] : Feels safe at home [] : No [Audit-CScore] : 1 [[id number]] : 0 [Change in mental status noted] : No change in mental status noted [Language] : denies difficulty with language [Reports changes in hearing] : Reports no changes in hearing [Reports changes in vision] : Reports no changes in vision [Reports changes in dental health] : Reports no changes in dental health [PapSmearDate] : 12/19 [FreeTextEntry2] : real estate

## 2020-10-12 NOTE — PLAN
[FreeTextEntry1] : HCM\par -routine labs follow up results\par -depression screen negative\par -COVID Ab test ordered\par -pap smear 12/19\par -flu shot today\par \par Acid reflux\par -trial of Pepcid\par -requesting EGD, information for GI provided\par -counseled on diet modifications to help with acid reflux symptoms

## 2020-10-20 ENCOUNTER — APPOINTMENT (OUTPATIENT)
Dept: INTERNAL MEDICINE | Facility: CLINIC | Age: 40
End: 2020-10-20

## 2021-03-12 ENCOUNTER — NON-APPOINTMENT (OUTPATIENT)
Age: 41
End: 2021-03-12

## 2021-03-15 ENCOUNTER — APPOINTMENT (OUTPATIENT)
Dept: INTERNAL MEDICINE | Facility: CLINIC | Age: 41
End: 2021-03-15
Payer: COMMERCIAL

## 2021-03-15 VITALS
BODY MASS INDEX: 29.45 KG/M2 | TEMPERATURE: 98.9 F | DIASTOLIC BLOOD PRESSURE: 76 MMHG | HEART RATE: 85 BPM | HEIGHT: 60 IN | WEIGHT: 150 LBS | SYSTOLIC BLOOD PRESSURE: 112 MMHG | OXYGEN SATURATION: 98 %

## 2021-03-15 VITALS — SYSTOLIC BLOOD PRESSURE: 108 MMHG | DIASTOLIC BLOOD PRESSURE: 76 MMHG

## 2021-03-15 DIAGNOSIS — Z87.01 PERSONAL HISTORY OF PNEUMONIA (RECURRENT): ICD-10-CM

## 2021-03-15 DIAGNOSIS — M94.0 CHONDROCOSTAL JUNCTION SYNDROME [TIETZE]: ICD-10-CM

## 2021-03-15 PROCEDURE — 99203 OFFICE O/P NEW LOW 30 MIN: CPT

## 2021-03-15 PROCEDURE — 99072 ADDL SUPL MATRL&STAF TM PHE: CPT

## 2021-03-15 NOTE — PHYSICAL EXAM
[No Acute Distress] : no acute distress [Well Nourished] : well nourished [Well Developed] : well developed [No JVD] : no jugular venous distention [Supple] : supple [No Respiratory Distress] : no respiratory distress  [Clear to Auscultation] : lungs were clear to auscultation bilaterally [Normal Rate] : normal rate  [Regular Rhythm] : with a regular rhythm [Normal S1, S2] : normal S1 and S2 [No Edema] : there was no peripheral edema [No Extremity Clubbing/Cyanosis] : no extremity clubbing/cyanosis [Soft] : abdomen soft [Non Tender] : non-tender [Non-distended] : non-distended [Normal Bowel Sounds] : normal bowel sounds [No CVA Tenderness] : no CVA  tenderness [No Spinal Tenderness] : no spinal tenderness [No Joint Swelling] : no joint swelling [Grossly Normal Strength/Tone] : grossly normal strength/tone [Speech Grossly Normal] : speech grossly normal [Normal Affect] : the affect was normal [Alert and Oriented x3] : oriented to person, place, and time [Normal Mood] : the mood was normal [de-identified] : female in stated age,

## 2021-03-15 NOTE — ASSESSMENT
[FreeTextEntry1] : 39 y/o female presented to me for the first time for transition of care.  She has a h/o GERD symptoms in 10/2020, she was treated with Pepcid with resolution.  Recently, she developed epigastric pain, after eating spicy food and drinking coffee.  Since then, she has been having epigastric pain intermittently, mostly in the evening, and it can last 1-2 hours.  She tried Pepcid but no relief, then changed over to OTC Nexium 20 mg daily for the last few days, but also did not find relief.  She has made some dietary changes in the past week, and hasn't seen any improvement.\par \par Exam was unremarkable.  I believe pt has GERD/gastritis symptoms.  Anti-reflux diet d/w her.  She denied NSAID use, I also advised d/c non-essential meds, such as collagen.  I will treat her empirically with Omeprazole 40 mg daily.  If no improvement, then may need GI evaluation.

## 2021-03-15 NOTE — HISTORY OF PRESENT ILLNESS
End of shift SBAR to be given to oncoming nurse. Patient remains alert and oriented with no complaints of pain. Patient has slept most of shift and reports feeling better than she has in weeks. Family has been updated on patient's condition. Bed in low, locked position. VSS, afebrile. [Abdominal Pain] : abdominal pain [___ Weeks ago] : [unfilled] weeks ago [Episodic] : episodic [Eating] : after eating [Improving] : improving [Nausea] : no nausea [Vomiting] : no vomiting [Anorexia] : no anorexia [Sore Throat] : no sore throat [de-identified] : Epigastric, pain is 4-5 /10 [FreeTextEntry1] : Had acid reflux in 10/2020 and better with Pepcid, the  pain can last 1-2 hours, [de-identified] : almost daily, [FreeTextEntry7] : It started after eating spicy food and dark coffee [FreeTextEntry5] : Pepcid & Nexium 20 mg [FreeTextEntry4] : Mostly in the evening, [FreeTextEntry8] : Pt likes spicy food, and drinks 1 coffee and 1 tea per day.  She denied NSAID use.\par \par Pt has 10 lbs intentional weight loss in 3 months, by a change of diet and exercise.

## 2021-05-17 ENCOUNTER — RX RENEWAL (OUTPATIENT)
Age: 41
End: 2021-05-17

## 2021-07-29 ENCOUNTER — RX RENEWAL (OUTPATIENT)
Age: 41
End: 2021-07-29

## 2021-12-16 NOTE — HISTORY OF PRESENT ILLNESS
Detail Level: Simple [FreeTextEntry1] : CPE, acid reflux [de-identified] : Patient presents for CPE.\par She notes that for the past month she has been having more acid reflux. She has the sensation of something stuck which goes up from her chest to her throat when she lays down. She also notes certain foods trigger the acid such as fruits, coffee and wine. She has had reflux in the past, did not take any medication at the time. Denies any palp, SOB, abd pain, N/V/D.

## 2022-01-10 ENCOUNTER — LABORATORY RESULT (OUTPATIENT)
Age: 42
End: 2022-01-10

## 2022-01-12 ENCOUNTER — LABORATORY RESULT (OUTPATIENT)
Age: 42
End: 2022-01-12

## 2022-01-12 LAB
25(OH)D3 SERPL-MCNC: 24.2 NG/ML
ALBUMIN SERPL ELPH-MCNC: 5 G/DL
ALP BLD-CCNC: 51 U/L
ALT SERPL-CCNC: 21 U/L
ANION GAP SERPL CALC-SCNC: 17 MMOL/L
APPEARANCE: CLEAR
AST SERPL-CCNC: 17 U/L
BASOPHILS # BLD AUTO: 0.04 K/UL
BASOPHILS NFR BLD AUTO: 0.6 %
BILIRUB SERPL-MCNC: 0.6 MG/DL
BILIRUBIN URINE: NEGATIVE
BLOOD URINE: NEGATIVE
BUN SERPL-MCNC: 12 MG/DL
CALCIUM SERPL-MCNC: 9.5 MG/DL
CHLORIDE SERPL-SCNC: 100 MMOL/L
CHOLEST SERPL-MCNC: 201 MG/DL
CO2 SERPL-SCNC: 22 MMOL/L
COLOR: COLORLESS
CREAT SERPL-MCNC: 0.75 MG/DL
EOSINOPHIL # BLD AUTO: 0.16 K/UL
EOSINOPHIL NFR BLD AUTO: 2.3 %
ESTIMATED AVERAGE GLUCOSE: 108 MG/DL
GLUCOSE QUALITATIVE U: NEGATIVE
GLUCOSE SERPL-MCNC: 88 MG/DL
HBA1C MFR BLD HPLC: 5.4 %
HBV CORE IGG+IGM SER QL: NONREACTIVE
HBV CORE IGM SER QL: NONREACTIVE
HBV SURFACE AB SERPL IA-ACNC: 124 MIU/ML
HBV SURFACE AG SER QL: NONREACTIVE
HCT VFR BLD CALC: 44.2 %
HCV AB SER QL: NONREACTIVE
HCV S/CO RATIO: 0.09 S/CO
HDLC SERPL-MCNC: 56 MG/DL
HEPATITIS A IGG ANTIBODY: REACTIVE
HGB BLD-MCNC: 14.3 G/DL
IMM GRANULOCYTES NFR BLD AUTO: 0.3 %
KETONES URINE: NEGATIVE
LDLC SERPL CALC-MCNC: 130 MG/DL
LEUKOCYTE ESTERASE URINE: ABNORMAL
LYMPHOCYTES # BLD AUTO: 2.5 K/UL
LYMPHOCYTES NFR BLD AUTO: 36 %
MAN DIFF?: NORMAL
MCHC RBC-ENTMCNC: 29.7 PG
MCHC RBC-ENTMCNC: 32.4 GM/DL
MCV RBC AUTO: 91.9 FL
MONOCYTES # BLD AUTO: 0.27 K/UL
MONOCYTES NFR BLD AUTO: 3.9 %
NEUTROPHILS # BLD AUTO: 3.96 K/UL
NEUTROPHILS NFR BLD AUTO: 56.9 %
NITRITE URINE: NEGATIVE
NONHDLC SERPL-MCNC: 145 MG/DL
PH URINE: 7
PLATELET # BLD AUTO: 327 K/UL
POTASSIUM SERPL-SCNC: 4 MMOL/L
PROT SERPL-MCNC: 7.7 G/DL
PROTEIN URINE: NEGATIVE
RBC # BLD: 4.81 M/UL
RBC # FLD: 11.8 %
SODIUM SERPL-SCNC: 138 MMOL/L
SPECIFIC GRAVITY URINE: 1.01
T4 FREE SERPL-MCNC: 1.5 NG/DL
TRIGL SERPL-MCNC: 75 MG/DL
TSH SERPL-ACNC: 3.07 UIU/ML
UROBILINOGEN URINE: NORMAL
WBC # FLD AUTO: 6.95 K/UL

## 2022-01-20 ENCOUNTER — APPOINTMENT (OUTPATIENT)
Dept: INTERNAL MEDICINE | Facility: CLINIC | Age: 42
End: 2022-01-20
Payer: COMMERCIAL

## 2022-01-20 ENCOUNTER — NON-APPOINTMENT (OUTPATIENT)
Age: 42
End: 2022-01-20

## 2022-01-20 VITALS
HEART RATE: 89 BPM | TEMPERATURE: 98.1 F | SYSTOLIC BLOOD PRESSURE: 124 MMHG | BODY MASS INDEX: 26.75 KG/M2 | DIASTOLIC BLOOD PRESSURE: 80 MMHG | WEIGHT: 151 LBS | HEIGHT: 63 IN | OXYGEN SATURATION: 97 %

## 2022-01-20 DIAGNOSIS — Z23 ENCOUNTER FOR IMMUNIZATION: ICD-10-CM

## 2022-01-20 DIAGNOSIS — R07.89 OTHER CHEST PAIN: ICD-10-CM

## 2022-01-20 DIAGNOSIS — Z13.31 ENCOUNTER FOR SCREENING FOR DEPRESSION: ICD-10-CM

## 2022-01-20 DIAGNOSIS — Z78.9 OTHER SPECIFIED HEALTH STATUS: ICD-10-CM

## 2022-01-20 DIAGNOSIS — Z87.898 PERSONAL HISTORY OF OTHER SPECIFIED CONDITIONS: ICD-10-CM

## 2022-01-20 DIAGNOSIS — Z87.19 PERSONAL HISTORY OF OTHER DISEASES OF THE DIGESTIVE SYSTEM: ICD-10-CM

## 2022-01-20 PROCEDURE — 99395 PREV VISIT EST AGE 18-39: CPT

## 2022-01-20 RX ORDER — OMEPRAZOLE 40 MG/1
40 CAPSULE, DELAYED RELEASE ORAL
Qty: 30 | Refills: 0 | Status: COMPLETED | COMMUNITY
Start: 2021-03-15 | End: 2022-01-20

## 2022-01-20 RX ORDER — BACILLUS COAGULANS/INULIN 1B-250 MG
CAPSULE ORAL
Refills: 0 | Status: COMPLETED | COMMUNITY
End: 2022-01-20

## 2022-01-20 RX ORDER — COLLAGEN, HYDROLYSATE (BOVINE) 100 %
POWDER (GRAM) MISCELLANEOUS
Refills: 0 | Status: COMPLETED | COMMUNITY
Start: 2021-03-15 | End: 2022-01-20

## 2022-01-20 RX ORDER — CETIRIZINE HYDROCHLORIDE 10 MG/1
10 TABLET, FILM COATED ORAL
Qty: 90 | Refills: 1 | Status: ACTIVE | COMMUNITY
Start: 2022-01-20

## 2022-01-21 PROBLEM — R07.89 ATYPICAL CHEST PAIN: Status: RESOLVED | Noted: 2020-02-03 | Resolved: 2022-01-21

## 2022-01-21 PROBLEM — Z87.19 HISTORY OF EPIGASTRIC PAIN: Status: RESOLVED | Noted: 2021-03-15 | Resolved: 2022-01-21

## 2022-01-21 PROBLEM — Z13.31 DEPRESSION SCREENING: Status: ACTIVE | Noted: 2019-09-24

## 2022-01-21 PROBLEM — Z23 ENCOUNTER FOR IMMUNIZATION: Status: ACTIVE | Noted: 2020-10-12

## 2022-01-21 PROBLEM — Z78.9 NO PERTINENT PAST MEDICAL HISTORY: Status: RESOLVED | Noted: 2019-09-24 | Resolved: 2022-01-21

## 2022-01-21 NOTE — REVIEW OF SYSTEMS
[Joint Pain] : joint pain [Back Pain] : back pain [Headache] : headache [Negative] : Heme/Lymph [Fever] : no fever [Chills] : no chills [Fatigue] : no fatigue [Recent Change In Weight] : ~T no recent weight change [Chest Pain] : no chest pain [Palpitations] : no palpitations [Lower Ext Edema] : no lower extremity edema [Shortness Of Breath] : no shortness of breath [Wheezing] : no wheezing [Cough] : no cough [Dyspnea on Exertion] : no dyspnea on exertion [Abdominal Pain] : no abdominal pain [Nausea] : no nausea [Constipation] : no constipation [Diarrhea] : diarrhea [Vomiting] : no vomiting [Heartburn] : no heartburn [Melena] : no melena [Dysuria] : no dysuria [Incontinence] : no incontinence [Nocturia] : no nocturia [Hematuria] : no hematuria [Joint Stiffness] : no joint stiffness [Joint Swelling] : no joint swelling [Itching] : no itching [Mole Changes] : no mole changes [Skin Rash] : no skin rash [Dizziness] : no dizziness [Fainting] : no fainting [Unsteady Walking] : no ataxia [Insomnia] : no insomnia [Anxiety] : no anxiety [Depression] : no depression [Easy Bleeding] : no easy bleeding [Easy Bruising] : no easy bruising [Swollen Glands] : no swollen glands [FreeTextEntry3] : Wears corrective lens, she has floaters for about a year, [FreeTextEntry4] : She has ringing in the ears occ, getting over an URI, [FreeTextEntry9] : has knee pain and LBP as in HPI,  [de-identified] : Dry skin on legs. [de-identified] : Has occ HA,

## 2022-01-21 NOTE — HISTORY OF PRESENT ILLNESS
[FreeTextEntry1] : Pt presented for PE.  Last PE was 10/2020. [de-identified] : Pt saw me for GERD symptoms, and she was treated with PPI.  She was better and stopped meds after about a month.   She went to see GI, and had EGD, found some erythema and told she has gastritis, she was given a medication, and she no longer takes meds now.\par \par Pt also had URI 3 weeks ago, and had 2 negative rapid test and 1 negative PCR test for COVID.  She took Mucinex occ and is better now.\par \par She c/o knee clicking and her knee was unstable.  It's on both knees, but worse on L side.  She dances 3x per week, and it was uncomfortable with dancing, and so stopped dancing over the last month.\par \par Pt was well and did not get sick throughout the COVID pandemic.  She had COVID vaccine but only had 2 doses, and did not get the booster vaccine.

## 2022-01-21 NOTE — PHYSICAL EXAM
[No Acute Distress] : no acute distress [Well Nourished] : well nourished [Well Developed] : well developed [Normal Sclera/Conjunctiva] : normal sclera/conjunctiva [PERRL] : pupils equal round and reactive to light [EOMI] : extraocular movements intact [Normal Outer Ear/Nose] : the outer ears and nose were normal in appearance [Normal Oropharynx] : the oropharynx was normal [Normal TMs] : both tympanic membranes were normal [Normal Nasal Mucosa] : the nasal mucosa was normal [No JVD] : no jugular venous distention [No Lymphadenopathy] : no lymphadenopathy [Supple] : supple [No Respiratory Distress] : no respiratory distress  [Clear to Auscultation] : lungs were clear to auscultation bilaterally [Normal Rate] : normal rate  [Regular Rhythm] : with a regular rhythm [Normal S1, S2] : normal S1 and S2 [No Carotid Bruits] : no carotid bruits [Pedal Pulses Present] : the pedal pulses are present [No Edema] : there was no peripheral edema [No Extremity Clubbing/Cyanosis] : no extremity clubbing/cyanosis [Normal Appearance] : normal in appearance [No Nipple Discharge] : no nipple discharge [No Axillary Lymphadenopathy] : no axillary lymphadenopathy [Soft] : abdomen soft [Non Tender] : non-tender [Non-distended] : non-distended [No Masses] : no abdominal mass palpated [No HSM] : no HSM [Normal Bowel Sounds] : normal bowel sounds [Normal Supraclavicular Nodes] : no supraclavicular lymphadenopathy [Normal Axillary Nodes] : no axillary lymphadenopathy [Normal Posterior Cervical Nodes] : no posterior cervical lymphadenopathy [Normal Anterior Cervical Nodes] : no anterior cervical lymphadenopathy [No CVA Tenderness] : no CVA  tenderness [No Spinal Tenderness] : no spinal tenderness [No Joint Swelling] : no joint swelling [Grossly Normal Strength/Tone] : grossly normal strength/tone [No Rash] : no rash [Coordination Grossly Intact] : coordination grossly intact [No Focal Deficits] : no focal deficits [Normal Gait] : normal gait [Normal Affect] : the affect was normal [Alert and Oriented x3] : oriented to person, place, and time [de-identified] : Slightly overweight female in stated age,  [de-identified] : Both knees with good ROM, but L knee has crepitus with ROM,

## 2022-01-21 NOTE — HEALTH RISK ASSESSMENT
[Good] : ~his/her~ current health as good [Very Good] : ~his/her~  mood as very good [Never] : Never [2 - 4 times a month (2 pts)] : 2-4 times a month (2 points) [1 or 2 (0 pts)] : 1 or 2 (0 points) [Never (0 pts)] : Never (0 points) [No] : In the past 12 months have you used drugs other than those required for medical reasons? No [No falls in past year] : Patient reported no falls in the past year [0] : 2) Feeling down, depressed, or hopeless: Not at all (0) [None] : None [With Family] : lives with family [# of Members in Household ___] :  household currently consist of [unfilled] member(s) [Employed] : employed [College] : College [] :  [# Of Children ___] : has [unfilled] children [Feels Safe at Home] : Feels safe at home [Fully functional (bathing, dressing, toileting, transferring, walking, feeding)] : Fully functional (bathing, dressing, toileting, transferring, walking, feeding) [Fully functional (using the telephone, shopping, preparing meals, housekeeping, doing laundry, using] : Fully functional and needs no help or supervision to perform IADLs (using the telephone, shopping, preparing meals, housekeeping, doing laundry, using transportation, managing medications and managing finances) [Smoke Detector] : smoke detector [Carbon Monoxide Detector] : carbon monoxide detector [Seat Belt] :  uses seat belt [Audit-CScore] : 2 [de-identified] : dances 3 days per week, and 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

## 2022-01-21 NOTE — ASSESSMENT
[FreeTextEntry1] : Patient was up-to-date with all HCM tests.  She was reminded to have routine eye exam and dental care.

## 2022-04-21 NOTE — H&P ADULT - PROBLEM SELECTOR PROBLEM 3
Opioid Counseling: I discussed with the patient the potential side effects of opioids including but not limited to addiction, altered mental status, and depression. I stressed avoiding alcohol, benzodiazepines, muscle relaxants and sleep aids unless specifically okayed by a physician. The patient verbalized understanding of the proper use and possible adverse effects of opioids. All of the patient's questions and concerns were addressed. They were instructed to flush the remaining pills down the toilet if they did not need them for pain. Prophylactic measure UTI (urinary tract infection)

## 2023-01-18 LAB
25(OH)D3 SERPL-MCNC: 47.8 NG/ML
ALBUMIN SERPL ELPH-MCNC: 4.9 G/DL
ALP BLD-CCNC: 39 U/L
ALT SERPL-CCNC: 16 U/L
ANION GAP SERPL CALC-SCNC: 14 MMOL/L
APPEARANCE: CLEAR
AST SERPL-CCNC: 29 U/L
BASOPHILS # BLD AUTO: 0.02 K/UL
BASOPHILS NFR BLD AUTO: 0.3 %
BILIRUB SERPL-MCNC: 0.6 MG/DL
BILIRUBIN URINE: NEGATIVE
BLOOD URINE: NEGATIVE
BUN SERPL-MCNC: 13 MG/DL
CALCIUM SERPL-MCNC: 10 MG/DL
CHLORIDE SERPL-SCNC: 103 MMOL/L
CHOLEST SERPL-MCNC: 210 MG/DL
CO2 SERPL-SCNC: 24 MMOL/L
COLOR: NORMAL
CREAT SERPL-MCNC: 0.62 MG/DL
EGFR: 115 ML/MIN/1.73M2
EOSINOPHIL # BLD AUTO: 0.05 K/UL
EOSINOPHIL NFR BLD AUTO: 0.8 %
ESTIMATED AVERAGE GLUCOSE: 114 MG/DL
GLUCOSE QUALITATIVE U: NEGATIVE
GLUCOSE SERPL-MCNC: 101 MG/DL
HBA1C MFR BLD HPLC: 5.6 %
HCT VFR BLD CALC: 41.2 %
HDLC SERPL-MCNC: 75 MG/DL
HGB BLD-MCNC: 13.9 G/DL
IMM GRANULOCYTES NFR BLD AUTO: 0.6 %
KETONES URINE: NEGATIVE
LDLC SERPL CALC-MCNC: 120 MG/DL
LEUKOCYTE ESTERASE URINE: NEGATIVE
LYMPHOCYTES # BLD AUTO: 2.63 K/UL
LYMPHOCYTES NFR BLD AUTO: 42 %
MAN DIFF?: NORMAL
MCHC RBC-ENTMCNC: 31 PG
MCHC RBC-ENTMCNC: 33.7 GM/DL
MCV RBC AUTO: 92 FL
MONOCYTES # BLD AUTO: 0.33 K/UL
MONOCYTES NFR BLD AUTO: 5.3 %
NEUTROPHILS # BLD AUTO: 3.19 K/UL
NEUTROPHILS NFR BLD AUTO: 51 %
NITRITE URINE: NEGATIVE
NONHDLC SERPL-MCNC: 135 MG/DL
PH URINE: 7
PLATELET # BLD AUTO: 267 K/UL
POTASSIUM SERPL-SCNC: 4.9 MMOL/L
PROT SERPL-MCNC: 7.3 G/DL
PROTEIN URINE: NORMAL
RBC # BLD: 4.48 M/UL
RBC # FLD: 12.8 %
SODIUM SERPL-SCNC: 141 MMOL/L
SPECIFIC GRAVITY URINE: 1.02
T4 FREE SERPL-MCNC: 1.2 NG/DL
TRIGL SERPL-MCNC: 74 MG/DL
TSH SERPL-ACNC: 2.6 UIU/ML
UROBILINOGEN URINE: NORMAL
WBC # FLD AUTO: 6.26 K/UL

## 2023-03-08 NOTE — DISCHARGE NOTE PROVIDER - NSDCADMDATE_GEN_ALL_CORE_FT
15-Sep-2019 22:47 Patient with metastatic cholangiocarcinoma  Discussed with patient overall guarded prognosis  Oncology has reiterated poor prognosis, patient reports he understands his guarded prognosis but would like to pursue active treatment at this time and plans to follow-up with oncology on discharge for further cares  Oncology inputs noted  Follow-up with oncology as an outpatient   Elevated bilirubin noted - trending down

## 2023-04-05 ENCOUNTER — NON-APPOINTMENT (OUTPATIENT)
Age: 43
End: 2023-04-05

## 2023-04-05 ENCOUNTER — APPOINTMENT (OUTPATIENT)
Dept: INTERNAL MEDICINE | Facility: CLINIC | Age: 43
End: 2023-04-05
Payer: COMMERCIAL

## 2023-04-05 VITALS
SYSTOLIC BLOOD PRESSURE: 105 MMHG | HEIGHT: 63 IN | WEIGHT: 153 LBS | OXYGEN SATURATION: 98 % | TEMPERATURE: 98.1 F | DIASTOLIC BLOOD PRESSURE: 72 MMHG | BODY MASS INDEX: 27.11 KG/M2 | HEART RATE: 92 BPM

## 2023-04-05 DIAGNOSIS — E55.9 VITAMIN D DEFICIENCY, UNSPECIFIED: ICD-10-CM

## 2023-04-05 DIAGNOSIS — Z83.438 FAMILY HISTORY OF OTHER DISORDER OF LIPOPROTEIN METABOLISM AND OTHER LIPIDEMIA: ICD-10-CM

## 2023-04-05 PROCEDURE — 93000 ELECTROCARDIOGRAM COMPLETE: CPT

## 2023-04-05 PROCEDURE — 82270 OCCULT BLOOD FECES: CPT

## 2023-04-05 PROCEDURE — 99396 PREV VISIT EST AGE 40-64: CPT | Mod: 25

## 2023-04-05 RX ORDER — CHROMIUM 200 MCG
25 MCG TABLET ORAL DAILY
Qty: 90 | Refills: 3 | Status: ACTIVE | COMMUNITY
Start: 2023-04-05

## 2023-04-05 NOTE — REVIEW OF SYSTEMS
[Headache] : headache [Negative] : Heme/Lymph [Fever] : no fever [Chills] : no chills [Fatigue] : no fatigue [Recent Change In Weight] : ~T no recent weight change [Chest Pain] : no chest pain [Palpitations] : no palpitations [Lower Ext Edema] : no lower extremity edema [Shortness Of Breath] : no shortness of breath [Wheezing] : no wheezing [Cough] : no cough [Dyspnea on Exertion] : no dyspnea on exertion [Abdominal Pain] : no abdominal pain [Nausea] : no nausea [Constipation] : no constipation [Diarrhea] : diarrhea [Vomiting] : no vomiting [Heartburn] : no heartburn [Melena] : no melena [Dysuria] : no dysuria [Incontinence] : no incontinence [Nocturia] : no nocturia [Hematuria] : no hematuria [Joint Pain] : no joint pain [Joint Stiffness] : no joint stiffness [Joint Swelling] : no joint swelling [Itching] : no itching [Back Pain] : no back pain [Mole Changes] : no mole changes [Skin Rash] : no skin rash [Dizziness] : no dizziness [Fainting] : no fainting [Unsteady Walking] : no ataxia [Insomnia] : no insomnia [Anxiety] : no anxiety [Depression] : no depression [Easy Bleeding] : no easy bleeding [Easy Bruising] : no easy bruising [Swollen Glands] : no swollen glands [FreeTextEntry4] : She has ringing in the ears occ, she has been coughing on & off since URI 3-4 months ago with throat clearing, [FreeTextEntry3] : Wears corrective lens, she has floaters occ on R eye, [FreeTextEntry9] : Knees crackling some times, not too much pain, [de-identified] : Occ HA, mild,

## 2023-04-05 NOTE — HISTORY OF PRESENT ILLNESS
[FreeTextEntry1] : Pt presented for PE.  Last PE was 1/2022. [de-identified] : She had viral gastroenteritis about 2 weeks ago, she had fever and diarrhea.  Resolved after a few days. COVID test was negative. \par \par Pt was well and did not get sick throughout the COVID pandemic.  She had 2 doses of COVID vaccine.  She declined Flu vaccine.

## 2023-04-05 NOTE — PHYSICAL EXAM
[No Acute Distress] : no acute distress [Well Nourished] : well nourished [Well Developed] : well developed [Normal Sclera/Conjunctiva] : normal sclera/conjunctiva [PERRL] : pupils equal round and reactive to light [EOMI] : extraocular movements intact [Normal Outer Ear/Nose] : the outer ears and nose were normal in appearance [Normal Oropharynx] : the oropharynx was normal [Normal TMs] : both tympanic membranes were normal [Normal Nasal Mucosa] : the nasal mucosa was normal [No JVD] : no jugular venous distention [No Lymphadenopathy] : no lymphadenopathy [Supple] : supple [No Respiratory Distress] : no respiratory distress  [Clear to Auscultation] : lungs were clear to auscultation bilaterally [Normal Rate] : normal rate  [Regular Rhythm] : with a regular rhythm [Normal S1, S2] : normal S1 and S2 [No Carotid Bruits] : no carotid bruits [Pedal Pulses Present] : the pedal pulses are present [No Edema] : there was no peripheral edema [No Extremity Clubbing/Cyanosis] : no extremity clubbing/cyanosis [Normal Appearance] : normal in appearance [No Nipple Discharge] : no nipple discharge [No Axillary Lymphadenopathy] : no axillary lymphadenopathy [Soft] : abdomen soft [Non Tender] : non-tender [Non-distended] : non-distended [No Masses] : no abdominal mass palpated [No HSM] : no HSM [Normal Bowel Sounds] : normal bowel sounds [No CVA Tenderness] : no CVA  tenderness [No Spinal Tenderness] : no spinal tenderness [No Joint Swelling] : no joint swelling [Grossly Normal Strength/Tone] : grossly normal strength/tone [No Rash] : no rash [Coordination Grossly Intact] : coordination grossly intact [No Focal Deficits] : no focal deficits [Normal Gait] : normal gait [Normal Affect] : the affect was normal [Alert and Oriented x3] : oriented to person, place, and time [Normal Sphincter Tone] : normal sphincter tone [No Mass] : no mass [Normal Supraclavicular Nodes] : no supraclavicular lymphadenopathy [Normal Axillary Nodes] : no axillary lymphadenopathy [Normal Posterior Cervical Nodes] : no posterior cervical lymphadenopathy [Normal Anterior Cervical Nodes] : no anterior cervical lymphadenopathy [Speech Grossly Normal] : speech grossly normal [Normal Mood] : the mood was normal [Stool Occult Blood] : stool negative for occult blood [de-identified] : Slightly overweight female in stated age,  [de-identified] : Both knees with good ROM, but L knee has crepitus with ROM,

## 2023-04-05 NOTE — HEALTH RISK ASSESSMENT
[Very Good] : ~his/her~  mood as very good [Yes] : Yes [2 - 4 times a month (2 pts)] : 2-4 times a month (2 points) [1 or 2 (0 pts)] : 1 or 2 (0 points) [Never (0 pts)] : Never (0 points) [No] : In the past 12 months have you used drugs other than those required for medical reasons? No [No falls in past year] : Patient reported no falls in the past year [0] : 2) Feeling down, depressed, or hopeless: Not at all (0) [PHQ-2 Negative - No further assessment needed] : PHQ-2 Negative - No further assessment needed [None] : None [With Family] : lives with family [# of Members in Household ___] :  household currently consist of [unfilled] member(s) [Employed] : employed [College] : College [] :  [# Of Children ___] : has [unfilled] children [Feels Safe at Home] : Feels safe at home [Fully functional (bathing, dressing, toileting, transferring, walking, feeding)] : Fully functional (bathing, dressing, toileting, transferring, walking, feeding) [Fully functional (using the telephone, shopping, preparing meals, housekeeping, doing laundry, using] : Fully functional and needs no help or supervision to perform IADLs (using the telephone, shopping, preparing meals, housekeeping, doing laundry, using transportation, managing medications and managing finances) [Smoke Detector] : smoke detector [Carbon Monoxide Detector] : carbon monoxide detector [Seat Belt] :  uses seat belt [Never] : Never [Audit-CScore] : 2 [de-identified] : dances 3 days per week, otherwise somewhat active,  [NAZ0Ctixg] : 0 [EyeExamDate] : 01/22 [Change in mental status noted] : No change in mental status noted [Reports changes in hearing] : Reports no changes in hearing [Reports changes in vision] : Reports no changes in vision [Reports changes in dental health] : Reports no changes in dental health [MammogramDate] : 02/23 [MammogramComments] : 02/23 [PapSmearDate] : 03/23 [de-identified] : dentist - 11/22, every 6 months,

## 2023-09-22 ENCOUNTER — APPOINTMENT (OUTPATIENT)
Dept: INTERNAL MEDICINE | Facility: CLINIC | Age: 43
End: 2023-09-22

## 2024-02-06 NOTE — PATIENT PROFILE ADULT - NSTOBACCONEVERSMOKERY/N_GEN_A
After Visit Summary   6/14/2018    Denise Felder    MRN: [id number]           Patient Information     Date Of Birth          1981        Visit Information        Provider Department      6/14/2018 2:00 PM Nola Vides APRN CNP [facility]        Today's Diagnoses     Screen for STD (sexually transmitted disease)    -  1      Care Instructions          Thank you for choosing [facility]  You may be receiving a survey in the mail from [facility] regarding your visit today.  Please take a few minutes to complete and return the survey to let us know how we are doing.      If you have questions or concerns, please contact us via [employer] or you can contact your care team at [phone].    Our Clinic hours are:  Monday 6:40 am  to 7:00 pm  Tuesday -Friday 6:40 am to 5:00 pm    The [place] outpatient lab hours are:  Monday - Friday 6:10 am to 4:45 pm  Saturdays 7:00 am to 11:00 am  Appointments are required, call [phone]    If you have clinical questions after hours or would like to schedule an appointment,  call the clinic at [phone].          Follow-ups after your visit        Your next 10 appointments already scheduled     Jun 14, 2018  2:00 PM CDT   Office Visit with NANDO Ledezma CNP   [facility]    [address] [place] [address] [phone]           Bring a current list of meds and any records pertaining to this visit. For Physicals, please bring immunization records and any forms needing to be filled out. Please arrive 10 minutes early to complete paperwork.            Jun 19, 2018  4:00 PM CDT   Return Visit with IRVIN Tate   [facility] ([facility])    [address]  [place] [address] [phone]              Who to contact     If you have questions or need follow up information about today's clinic visit or your  schedule please contact [facility] directly at [phone].  Normal or non-critical lab and imaging results will be communicated to you by MyChart, letter or phone within 4 business days after the clinic has received the results. If you do not hear from us within 7 days, please contact the clinic through MyChart or phone. If you have a critical or abnormal lab result, we will notify you by phone as soon as possible.  Submit refill requests through [employer] or call your pharmacy and they will forward the refill request to us. Please allow 3 business days for your refill to be completed.          Additional Information About Your Visit        [employer] Information     [employer] gives you secure access to your electronic health record. If you see a primary care provider, you can also send messages to your care team and make appointments. If you have questions, please call your primary care clinic.  If you do not have a primary care provider, please call [phone] and they will assist you.        Care EveryWhere ID     This is your Care EveryWhere ID. This could be used by other organizations to access your [place] medical records  [id number]        Your Vitals Were     Pulse Last Period Pulse Oximetry BMI (Body Mass Index)          62 05/15/2018 (Exact Date) 98% 35.15 kg/m2         Blood Pressure from Last 3 Encounters:   06/14/18 105/69   06/01/18 98/60   02/08/18 117/70    Weight from Last 3 Encounters:   06/14/18 200 lb (90.7 kg)   06/01/18 198 lb (89.8 kg)   02/08/18 205 lb (93 kg)              We Performed the Following     Chlamydia trachomatis PCR     HIV Antigen Antibody Combo     Neisseria gonorrhoeae PCR        Primary Care Provider Office Phone # Fax #    Sulaiman Melvin [phone] [address] [facility] [address]        Equal Access to Services     ZEHRA BOWER : Elbert Burnette, ashli bennett, ayesha pereira  "matt handley laMehnazelio ah. So [facility] [phone].    ATENCIÓN: Si radha woods, tiene a suarez disposición servicios gratuitos de asistencia lingüística. Kody flores [phone].    We comply with applicable federal civil rights laws and [place] laws. We do not discriminate on the basis of race, color, national origin, age, disability, sex, sexual orientation, or gender identity.            Thank you!     Thank you for choosing [facility]  for your care. Our goal is always to provide you with excellent care. Hearing back from our patients is one way we can continue to improve our services. Please take a few minutes to complete the written survey that you may receive in the mail after your visit with us. Thank you!             Your Updated Medication List - Protect others around you: Learn how to safely use, store and throw away your medicines at www.disposemymeds.org.          This list is accurate as of 18 10:43 AM.  Always use your most recent med list.                   Brand Name Dispense Instructions for use Diagnosis    albuterol 108 (90 Base) MCG/ACT Inhaler    PROAIR HFA/PROVENTIL HFA/VENTOLIN HFA    1 Inhaler    Inhale 2 puffs into the lungs every 4 hours as needed for shortness of breath / dyspnea or wheezing    Wheezing, Chronic seasonal allergic rhinitis, unspecified trigger       Alcohol Wipes 70 % Pads     30 each    Use to cleanse skin monthly prior to B12 injection    Bariatric surgery status       ALL DAY CALCIUM PO      Take 600 mg by mouth 2 times daily (before meals).        cyanocobalamin 1000 MCG/ML injection    VITAMIN B12    1 mL    Inject 1 mL (1,000 mcg) into the muscle every 30 days    Bariatric surgery status       escitalopram 20 MG tablet    LEXAPRO    90 tablet    Take 1 tablet (20 mg) by mouth daily    Anxiety, Major depressive disorder, recurrent episode, moderate (H)       PRE- FORMULA Tabs     30 tablet    1 tablet daily        Syringe/Needle (Disp) 23G X 1-\" 3 " ML Misc     12 each    Use to inject B12 monthly    Bariatric surgery status       VITAMIN D3 PO      Take 4,000 Units by mouth daily.        VITRON-C PO      Take by mouth daily (before lunch)           complains of pain/discomfort No

## 2024-03-06 ENCOUNTER — NON-APPOINTMENT (OUTPATIENT)
Age: 44
End: 2024-03-06

## 2024-03-08 LAB
ALBUMIN SERPL ELPH-MCNC: 4.8 G/DL
ALP BLD-CCNC: 35 U/L
ALT SERPL-CCNC: 16 U/L
ANION GAP SERPL CALC-SCNC: 13 MMOL/L
APPEARANCE: CLEAR
AST SERPL-CCNC: 14 U/L
BASOPHILS # BLD AUTO: 0.03 K/UL
BASOPHILS NFR BLD AUTO: 0.6 %
BILIRUB SERPL-MCNC: 0.5 MG/DL
BILIRUBIN URINE: NEGATIVE
BLOOD URINE: NEGATIVE
BUN SERPL-MCNC: 15 MG/DL
CALCIUM SERPL-MCNC: 9.4 MG/DL
CHLORIDE SERPL-SCNC: 103 MMOL/L
CHOLEST SERPL-MCNC: 190 MG/DL
CO2 SERPL-SCNC: 24 MMOL/L
COLOR: YELLOW
CREAT SERPL-MCNC: 0.73 MG/DL
EGFR: 106 ML/MIN/1.73M2
EOSINOPHIL # BLD AUTO: 0.07 K/UL
EOSINOPHIL NFR BLD AUTO: 1.3 %
ESTIMATED AVERAGE GLUCOSE: 114 MG/DL
GLUCOSE QUALITATIVE U: NEGATIVE MG/DL
GLUCOSE SERPL-MCNC: 96 MG/DL
HBA1C MFR BLD HPLC: 5.6 %
HCT VFR BLD CALC: 40.5 %
HDLC SERPL-MCNC: 68 MG/DL
HGB BLD-MCNC: 13.4 G/DL
IMM GRANULOCYTES NFR BLD AUTO: 0.2 %
KETONES URINE: NEGATIVE MG/DL
LDLC SERPL CALC-MCNC: 111 MG/DL
LEUKOCYTE ESTERASE URINE: NEGATIVE
LYMPHOCYTES # BLD AUTO: 1.88 K/UL
LYMPHOCYTES NFR BLD AUTO: 36.2 %
MAN DIFF?: NORMAL
MCHC RBC-ENTMCNC: 30 PG
MCHC RBC-ENTMCNC: 33.1 GM/DL
MCV RBC AUTO: 90.6 FL
MONOCYTES # BLD AUTO: 0.27 K/UL
MONOCYTES NFR BLD AUTO: 5.2 %
NEUTROPHILS # BLD AUTO: 2.94 K/UL
NEUTROPHILS NFR BLD AUTO: 56.5 %
NITRITE URINE: NEGATIVE
NONHDLC SERPL-MCNC: 122 MG/DL
PH URINE: 7
PLATELET # BLD AUTO: 289 K/UL
POTASSIUM SERPL-SCNC: 4.9 MMOL/L
PROT SERPL-MCNC: 7 G/DL
PROTEIN URINE: NEGATIVE MG/DL
RBC # BLD: 4.47 M/UL
RBC # FLD: 12.2 %
SODIUM SERPL-SCNC: 140 MMOL/L
SPECIFIC GRAVITY URINE: 1.01
T4 FREE SERPL-MCNC: 1.4 NG/DL
TRIGL SERPL-MCNC: 58 MG/DL
TSH SERPL-ACNC: 2.66 UIU/ML
UROBILINOGEN URINE: 0.2 MG/DL
WBC # FLD AUTO: 5.2 K/UL

## 2024-03-11 ENCOUNTER — APPOINTMENT (OUTPATIENT)
Dept: INTERNAL MEDICINE | Facility: CLINIC | Age: 44
End: 2024-03-11
Payer: COMMERCIAL

## 2024-03-11 ENCOUNTER — NON-APPOINTMENT (OUTPATIENT)
Age: 44
End: 2024-03-11

## 2024-03-11 VITALS
BODY MASS INDEX: 27.11 KG/M2 | WEIGHT: 153 LBS | HEART RATE: 82 BPM | HEIGHT: 63 IN | DIASTOLIC BLOOD PRESSURE: 66 MMHG | TEMPERATURE: 97.7 F | OXYGEN SATURATION: 98 % | RESPIRATION RATE: 15 BRPM | SYSTOLIC BLOOD PRESSURE: 97 MMHG

## 2024-03-11 VITALS — DIASTOLIC BLOOD PRESSURE: 70 MMHG | SYSTOLIC BLOOD PRESSURE: 112 MMHG

## 2024-03-11 DIAGNOSIS — K21.9 GASTRO-ESOPHAGEAL REFLUX DISEASE W/OUT ESOPHAGITIS: ICD-10-CM

## 2024-03-11 DIAGNOSIS — M25.561 PAIN IN RIGHT KNEE: ICD-10-CM

## 2024-03-11 DIAGNOSIS — Z00.00 ENCOUNTER FOR GENERAL ADULT MEDICAL EXAMINATION W/OUT ABNORMAL FINDINGS: ICD-10-CM

## 2024-03-11 DIAGNOSIS — E78.5 HYPERLIPIDEMIA, UNSPECIFIED: ICD-10-CM

## 2024-03-11 DIAGNOSIS — Z13.6 ENCOUNTER FOR SCREENING FOR CARDIOVASCULAR DISORDERS: ICD-10-CM

## 2024-03-11 DIAGNOSIS — E66.3 OVERWEIGHT: ICD-10-CM

## 2024-03-11 DIAGNOSIS — U07.1 COVID-19: ICD-10-CM

## 2024-03-11 DIAGNOSIS — J30.9 ALLERGIC RHINITIS, UNSPECIFIED: ICD-10-CM

## 2024-03-11 DIAGNOSIS — M25.562 PAIN IN RIGHT KNEE: ICD-10-CM

## 2024-03-11 DIAGNOSIS — Z78.9 OTHER SPECIFIED HEALTH STATUS: ICD-10-CM

## 2024-03-11 DIAGNOSIS — Z11.59 ENCOUNTER FOR SCREENING FOR OTHER VIRAL DISEASES: ICD-10-CM

## 2024-03-11 DIAGNOSIS — R74.8 ABNORMAL LEVELS OF OTHER SERUM ENZYMES: ICD-10-CM

## 2024-03-11 PROCEDURE — 99396 PREV VISIT EST AGE 40-64: CPT | Mod: 25

## 2024-03-11 PROCEDURE — 82270 OCCULT BLOOD FECES: CPT

## 2024-03-11 PROCEDURE — 93000 ELECTROCARDIOGRAM COMPLETE: CPT

## 2024-03-11 RX ORDER — POLYVINYL ALCOHOL, POVIDONE 14; 6 MG/ML; MG/ML
1.4-0.6 SOLUTION/ DROPS OPHTHALMIC
Qty: 3 | Refills: 1 | Status: ACTIVE | COMMUNITY
Start: 2024-03-11

## 2024-03-11 RX ORDER — ASCORBIC ACID/MULTIVIT-MIN 1000 MG
EFFERVESCENT POWDER IN PACKET ORAL
Refills: 0 | Status: COMPLETED | COMMUNITY
Start: 2021-03-15 | End: 2024-03-11

## 2024-03-11 RX ORDER — YEAST,DRIED (S. CEREVISIAE)
POWDER (GRAM) ORAL DAILY
Refills: 0 | Status: COMPLETED | COMMUNITY
Start: 2022-01-20 | End: 2024-03-11

## 2024-03-11 NOTE — HEALTH RISK ASSESSMENT
[Very Good] : ~his/her~ current health as very good [Yes] : Yes [1 or 2 (0 pts)] : 1 or 2 (0 points) [2 - 4 times a month (2 pts)] : 2-4 times a month (2 points) [No] : In the past 12 months have you used drugs other than those required for medical reasons? No [Never (0 pts)] : Never (0 points) [No falls in past year] : Patient reported no falls in the past year [0] : 2) Feeling down, depressed, or hopeless: Not at all (0) [PHQ-2 Negative - No further assessment needed] : PHQ-2 Negative - No further assessment needed [None] : None [With Family] : lives with family [# of Members in Household ___] :  household currently consist of [unfilled] member(s) [Employed] : employed [College] : College [] :  [# Of Children ___] : has [unfilled] children [Feels Safe at Home] : Feels safe at home [Fully functional (bathing, dressing, toileting, transferring, walking, feeding)] : Fully functional (bathing, dressing, toileting, transferring, walking, feeding) [Fully functional (using the telephone, shopping, preparing meals, housekeeping, doing laundry, using] : Fully functional and needs no help or supervision to perform IADLs (using the telephone, shopping, preparing meals, housekeeping, doing laundry, using transportation, managing medications and managing finances) [Smoke Detector] : smoke detector [Carbon Monoxide Detector] : carbon monoxide detector [Seat Belt] :  uses seat belt [Never] : Never [Little interest or pleasure doing things] : 1) Little interest or pleasure doing things [Feeling down, depressed, or hopeless] : 2) Feeling down, depressed, or hopeless [Audit-CScore] : 2 [de-identified] : Works out a lot till 12/2023, but then stopped due to tooth extraction, [[id number]] : 0 [EyeExamDate] : 03/24 [Change in mental status noted] : No change in mental status noted [Reports changes in hearing] : Reports no changes in hearing [Reports changes in vision] : Reports no changes in vision [Reports changes in dental health] : Reports no changes in dental health [MammogramDate] : 03/24 [MammogramComments] : US breast 3/2024 [PapSmearDate] : 02/24 [de-identified] : dentist - 1/24, every 6 months,

## 2024-03-11 NOTE — PAST MEDICAL HISTORY
[Menarche Age ____] : age at menarche was [unfilled] [Menstruating] : menstruating [Definite ___ (Date)] : the last menstrual period was [unfilled] [Normal Amount/Duration] : it was of a normal amount and duration [Total Preg ___] : G[unfilled] [Regular Cycle Intervals] : have been regular [Live Births ___] : P[unfilled]

## 2024-03-11 NOTE — PHYSICAL EXAM
[No Acute Distress] : no acute distress [Well Nourished] : well nourished [Well Developed] : well developed [PERRL] : pupils equal round and reactive to light [EOMI] : extraocular movements intact [Normal Sclera/Conjunctiva] : normal sclera/conjunctiva [Normal Oropharynx] : the oropharynx was normal [Normal Outer Ear/Nose] : the outer ears and nose were normal in appearance [Normal Nasal Mucosa] : the nasal mucosa was normal [Normal TMs] : both tympanic membranes were normal [No JVD] : no jugular venous distention [Supple] : supple [No Lymphadenopathy] : no lymphadenopathy [No Respiratory Distress] : no respiratory distress  [Clear to Auscultation] : lungs were clear to auscultation bilaterally [Normal Rate] : normal rate  [Normal S1, S2] : normal S1 and S2 [Regular Rhythm] : with a regular rhythm [No Carotid Bruits] : no carotid bruits [Pedal Pulses Present] : the pedal pulses are present [No Edema] : there was no peripheral edema [No Extremity Clubbing/Cyanosis] : no extremity clubbing/cyanosis [Normal Appearance] : normal in appearance [No Nipple Discharge] : no nipple discharge [No Axillary Lymphadenopathy] : no axillary lymphadenopathy [Soft] : abdomen soft [Non Tender] : non-tender [Non-distended] : non-distended [No Masses] : no abdominal mass palpated [No HSM] : no HSM [Normal Bowel Sounds] : normal bowel sounds [Normal Sphincter Tone] : normal sphincter tone [No Mass] : no mass [Normal Supraclavicular Nodes] : no supraclavicular lymphadenopathy [Normal Axillary Nodes] : no axillary lymphadenopathy [Normal Posterior Cervical Nodes] : no posterior cervical lymphadenopathy [Normal Anterior Cervical Nodes] : no anterior cervical lymphadenopathy [No Spinal Tenderness] : no spinal tenderness [No CVA Tenderness] : no CVA  tenderness [No Joint Swelling] : no joint swelling [Grossly Normal Strength/Tone] : grossly normal strength/tone [No Rash] : no rash [Coordination Grossly Intact] : coordination grossly intact [No Focal Deficits] : no focal deficits [Speech Grossly Normal] : speech grossly normal [Normal Gait] : normal gait [Normal Affect] : the affect was normal [Alert and Oriented x3] : oriented to person, place, and time [Normal Mood] : the mood was normal [Stool Occult Blood] : stool negative for occult blood [de-identified] : Overweight female in stated age,

## 2024-03-11 NOTE — REVIEW OF SYSTEMS
[Headache] : headache [Negative] : Neurological [Heartburn] : heartburn [Fever] : no fever [Fatigue] : no fatigue [Chills] : no chills [Recent Change In Weight] : ~T no recent weight change [Chest Pain] : no chest pain [Palpitations] : no palpitations [Lower Ext Edema] : no lower extremity edema [Wheezing] : no wheezing [Shortness Of Breath] : no shortness of breath [Dyspnea on Exertion] : no dyspnea on exertion [Cough] : no cough [Abdominal Pain] : no abdominal pain [Nausea] : no nausea [Constipation] : no constipation [Diarrhea] : diarrhea [Vomiting] : no vomiting [Melena] : no melena [Dysuria] : no dysuria [Incontinence] : no incontinence [Hematuria] : no hematuria [Nocturia] : no nocturia [Joint Pain] : no joint pain [Joint Stiffness] : no joint stiffness [Joint Swelling] : no joint swelling [Back Pain] : no back pain [Muscle Pain] : no muscle pain [Itching] : no itching [Skin Rash] : no skin rash [Mole Changes] : no mole changes [Fainting] : no fainting [Dizziness] : no dizziness [Insomnia] : no insomnia [Unsteady Walking] : no ataxia [Depression] : no depression [Anxiety] : no anxiety [Easy Bleeding] : no easy bleeding [Easy Bruising] : no easy bruising [FreeTextEntry3] : Wears corrective lens,  [Swollen Glands] : no swollen glands [FreeTextEntry4] : She has ringing in the ears occ, [de-identified] : Occ HA, mild, with menses, [FreeTextEntry7] : As in HPI

## 2024-03-11 NOTE — HISTORY OF PRESENT ILLNESS
[FreeTextEntry1] : Pt presented for PE.  Last PE was 1/2022. [de-identified] : Her health was uneventful since last visit, she has no new complaint.   Pt had COVID infection once in fall 2023, it was mild and she recovered completely.  She had 2 doses of COVID vaccine.  She declined Flu vaccine.  Pt c/o having pain in the front and back of her chest, it only lasted for seconds.  She took Pepcid because she had a lot of gas, she was not sure if meds helped.  She stopped coffee for 1 day.

## 2025-03-15 ENCOUNTER — LABORATORY RESULT (OUTPATIENT)
Age: 45
End: 2025-03-15

## 2025-03-18 ENCOUNTER — NON-APPOINTMENT (OUTPATIENT)
Age: 45
End: 2025-03-18

## 2025-03-18 ENCOUNTER — APPOINTMENT (OUTPATIENT)
Dept: INTERNAL MEDICINE | Facility: CLINIC | Age: 45
End: 2025-03-18
Payer: COMMERCIAL

## 2025-03-18 VITALS
DIASTOLIC BLOOD PRESSURE: 64 MMHG | TEMPERATURE: 97.2 F | HEART RATE: 83 BPM | OXYGEN SATURATION: 97 % | HEIGHT: 63 IN | BODY MASS INDEX: 26.4 KG/M2 | WEIGHT: 149 LBS | SYSTOLIC BLOOD PRESSURE: 97 MMHG

## 2025-03-18 VITALS — SYSTOLIC BLOOD PRESSURE: 108 MMHG | DIASTOLIC BLOOD PRESSURE: 62 MMHG

## 2025-03-18 DIAGNOSIS — Z13.6 ENCOUNTER FOR SCREENING FOR CARDIOVASCULAR DISORDERS: ICD-10-CM

## 2025-03-18 DIAGNOSIS — K21.9 GASTRO-ESOPHAGEAL REFLUX DISEASE W/OUT ESOPHAGITIS: ICD-10-CM

## 2025-03-18 DIAGNOSIS — Z78.9 OTHER SPECIFIED HEALTH STATUS: ICD-10-CM

## 2025-03-18 DIAGNOSIS — J30.9 ALLERGIC RHINITIS, UNSPECIFIED: ICD-10-CM

## 2025-03-18 DIAGNOSIS — B00.1 HERPESVIRAL VESICULAR DERMATITIS: ICD-10-CM

## 2025-03-18 DIAGNOSIS — J10.1 INFLUENZA DUE TO OTHER IDENTIFIED INFLUENZA VIRUS WITH OTHER RESPIRATORY MANIFESTATIONS: ICD-10-CM

## 2025-03-18 DIAGNOSIS — E78.5 HYPERLIPIDEMIA, UNSPECIFIED: ICD-10-CM

## 2025-03-18 DIAGNOSIS — E66.3 OVERWEIGHT: ICD-10-CM

## 2025-03-18 DIAGNOSIS — Z00.00 ENCOUNTER FOR GENERAL ADULT MEDICAL EXAMINATION W/OUT ABNORMAL FINDINGS: ICD-10-CM

## 2025-03-18 PROCEDURE — 93000 ELECTROCARDIOGRAM COMPLETE: CPT

## 2025-03-18 PROCEDURE — 99396 PREV VISIT EST AGE 40-64: CPT

## 2025-03-18 PROCEDURE — 82270 OCCULT BLOOD FECES: CPT

## 2025-03-18 RX ORDER — ELECTROLYTES/DEXTROSE
SOLUTION, ORAL ORAL DAILY
Refills: 0 | Status: ACTIVE | COMMUNITY
Start: 2025-03-18

## 2025-03-18 RX ORDER — ACYCLOVIR 100 %
POWDER (GRAM) MISCELLANEOUS
Refills: 0 | Status: ACTIVE | COMMUNITY
Start: 2025-03-18

## 2025-06-27 NOTE — PATIENT PROFILE ADULT - NSPROEDAREADYLEARN_GEN_A_NUR
Spoke with Verónica none